# Patient Record
Sex: FEMALE | Race: WHITE | NOT HISPANIC OR LATINO | Employment: OTHER | URBAN - METROPOLITAN AREA
[De-identification: names, ages, dates, MRNs, and addresses within clinical notes are randomized per-mention and may not be internally consistent; named-entity substitution may affect disease eponyms.]

---

## 2019-01-16 ENCOUNTER — HISTORICAL (OUTPATIENT)
Dept: RADIOLOGY | Facility: HOSPITAL | Age: 64
End: 2019-01-16

## 2019-07-03 ENCOUNTER — HISTORICAL (OUTPATIENT)
Dept: LAB | Facility: HOSPITAL | Age: 64
End: 2019-07-03

## 2019-07-03 LAB
ABS NEUT (OLG): 4.18 X10(3)/MCL (ref 2.1–9.2)
ALBUMIN SERPL-MCNC: 3.5 GM/DL (ref 3.4–5)
ALBUMIN/GLOB SERPL: 0.9 RATIO (ref 1.1–2)
ALP SERPL-CCNC: 70 UNIT/L (ref 46–116)
ALT SERPL-CCNC: 23 UNIT/L (ref 12–78)
AST SERPL-CCNC: 23 UNIT/L (ref 15–37)
BASOPHILS # BLD AUTO: 0.1 X10(3)/MCL (ref 0–0.2)
BASOPHILS NFR BLD AUTO: 1 %
BILIRUB SERPL-MCNC: 0.3 MG/DL (ref 0.2–1)
BILIRUBIN DIRECT+TOT PNL SERPL-MCNC: 0.09 MG/DL (ref 0–0.2)
BILIRUBIN DIRECT+TOT PNL SERPL-MCNC: 0.21 MG/DL (ref 0–0.8)
BUN SERPL-MCNC: 10.6 MG/DL (ref 7–18)
CALCIUM SERPL-MCNC: 9.2 MG/DL (ref 8.5–10.1)
CHLORIDE SERPL-SCNC: 103 MMOL/L (ref 98–107)
CHOLEST SERPL-MCNC: 247 MG/DL (ref 0–200)
CHOLEST/HDLC SERPL: 4 {RATIO} (ref 0–4)
CO2 SERPL-SCNC: 29.9 MMOL/L (ref 21–32)
CREAT SERPL-MCNC: 0.78 MG/DL (ref 0.6–1.3)
DEPRECATED CALCIDIOL+CALCIFEROL SERPL-MC: 27.15 NG/ML (ref 30–80)
EOSINOPHIL # BLD AUTO: 0.2 X10(3)/MCL (ref 0–0.9)
EOSINOPHIL NFR BLD AUTO: 3 %
ERYTHROCYTE [DISTWIDTH] IN BLOOD BY AUTOMATED COUNT: 13.1 % (ref 11.5–17)
GLOBULIN SER-MCNC: 3.7 GM/DL (ref 2.4–3.5)
GLUCOSE SERPL-MCNC: 98 MG/DL (ref 74–106)
HCT VFR BLD AUTO: 41.4 % (ref 37–47)
HDLC SERPL-MCNC: 61 MG/DL (ref 40–60)
HGB BLD-MCNC: 13.9 GM/DL (ref 12–16)
IMM GRANULOCYTES # BLD AUTO: 0.02 % (ref 0–0.02)
IMM GRANULOCYTES NFR BLD AUTO: 0.2 % (ref 0–0.43)
LDLC SERPL CALC-MCNC: 162 MG/DL (ref 0–129)
LYMPHOCYTES # BLD AUTO: 2.9 X10(3)/MCL (ref 0.6–4.6)
LYMPHOCYTES NFR BLD AUTO: 35 %
MCH RBC QN AUTO: 31.4 PG (ref 27–31)
MCHC RBC AUTO-ENTMCNC: 33.6 GM/DL (ref 33–36)
MCV RBC AUTO: 93.5 FL (ref 80–94)
MONOCYTES # BLD AUTO: 0.7 X10(3)/MCL (ref 0.1–1.3)
MONOCYTES NFR BLD AUTO: 9 %
NEUTROPHILS # BLD AUTO: 4.18 X10(3)/MCL (ref 1.4–7.9)
NEUTROPHILS NFR BLD AUTO: 52 %
PLATELET # BLD AUTO: 353 X10(3)/MCL (ref 130–400)
PMV BLD AUTO: 9 FL (ref 9.4–12.4)
POTASSIUM SERPL-SCNC: 5.3 MMOL/L (ref 3.5–5.1)
PROT SERPL-MCNC: 7.2 GM/DL (ref 6.4–8.2)
RBC # BLD AUTO: 4.43 X10(6)/MCL (ref 4.2–5.4)
SODIUM SERPL-SCNC: 140 MMOL/L (ref 136–145)
T4 FREE SERPL-MCNC: 0.93 NG/DL (ref 0.76–1.46)
TRIGL SERPL-MCNC: 118 MG/DL
TSH SERPL-ACNC: 5.03 MIU/ML (ref 0.36–3.74)
VLDLC SERPL CALC-MCNC: 24 MG/DL
WBC # SPEC AUTO: 8.1 X10(3)/MCL (ref 4.5–11.5)

## 2022-04-11 ENCOUNTER — HISTORICAL (OUTPATIENT)
Dept: ADMINISTRATIVE | Facility: HOSPITAL | Age: 67
End: 2022-04-11

## 2022-04-29 VITALS
BODY MASS INDEX: 22.4 KG/M2 | DIASTOLIC BLOOD PRESSURE: 79 MMHG | HEIGHT: 61 IN | WEIGHT: 118.63 LBS | OXYGEN SATURATION: 98 % | SYSTOLIC BLOOD PRESSURE: 152 MMHG

## 2022-12-26 ENCOUNTER — HOSPITAL ENCOUNTER (EMERGENCY)
Facility: HOSPITAL | Age: 67
Discharge: HOME | End: 2022-12-26
Attending: EMERGENCY MEDICINE
Payer: MEDICARE

## 2022-12-26 VITALS
SYSTOLIC BLOOD PRESSURE: 174 MMHG | WEIGHT: 107.69 LBS | TEMPERATURE: 98 F | HEART RATE: 78 BPM | OXYGEN SATURATION: 97 % | RESPIRATION RATE: 18 BRPM | DIASTOLIC BLOOD PRESSURE: 81 MMHG | BODY MASS INDEX: 21.14 KG/M2 | HEIGHT: 60 IN

## 2022-12-26 DIAGNOSIS — J44.1 COPD EXACERBATION: Primary | ICD-10-CM

## 2022-12-26 PROCEDURE — 99284 EMERGENCY DEPT VISIT MOD MDM: CPT

## 2022-12-26 RX ORDER — AMOXICILLIN AND CLAVULANATE POTASSIUM 875; 125 MG/1; MG/1
1 TABLET, FILM COATED ORAL 2 TIMES DAILY
Qty: 14 TABLET | Refills: 0 | OUTPATIENT
Start: 2022-12-26 | End: 2022-12-26 | Stop reason: SDUPTHER

## 2022-12-26 RX ORDER — AMOXICILLIN AND CLAVULANATE POTASSIUM 875; 125 MG/1; MG/1
1 TABLET, FILM COATED ORAL 2 TIMES DAILY
Qty: 14 TABLET | Refills: 0 | Status: SHIPPED | OUTPATIENT
Start: 2022-12-26 | End: 2022-12-26 | Stop reason: SDUPTHER

## 2022-12-26 RX ORDER — TIOTROPIUM BROMIDE 18 UG/1
18 CAPSULE ORAL; RESPIRATORY (INHALATION) DAILY
Qty: 30 CAPSULE | Refills: 11 | OUTPATIENT
Start: 2022-12-26 | End: 2022-12-26 | Stop reason: SDUPTHER

## 2022-12-26 RX ORDER — TIOTROPIUM BROMIDE 18 UG/1
18 CAPSULE ORAL; RESPIRATORY (INHALATION) DAILY
Qty: 30 CAPSULE | Refills: 11 | Status: SHIPPED | OUTPATIENT
Start: 2022-12-26 | End: 2022-12-26 | Stop reason: SDUPTHER

## 2022-12-26 RX ORDER — AMOXICILLIN AND CLAVULANATE POTASSIUM 875; 125 MG/1; MG/1
1 TABLET, FILM COATED ORAL 2 TIMES DAILY
Qty: 14 TABLET | Refills: 0 | OUTPATIENT
Start: 2022-12-26 | End: 2022-12-26

## 2022-12-26 RX ORDER — AMOXICILLIN AND CLAVULANATE POTASSIUM 875; 125 MG/1; MG/1
1 TABLET, FILM COATED ORAL 2 TIMES DAILY
Qty: 14 TABLET | Refills: 0 | Status: SHIPPED | OUTPATIENT
Start: 2022-12-26 | End: 2023-02-14

## 2022-12-26 RX ORDER — TIOTROPIUM BROMIDE 18 UG/1
18 CAPSULE ORAL; RESPIRATORY (INHALATION) DAILY
Qty: 30 CAPSULE | Refills: 11 | Status: SHIPPED | OUTPATIENT
Start: 2022-12-26 | End: 2023-02-14

## 2022-12-26 RX ORDER — PREDNISONE 20 MG/1
60 TABLET ORAL DAILY
Qty: 15 TABLET | Refills: 0 | Status: SHIPPED | OUTPATIENT
Start: 2022-12-26 | End: 2022-12-26 | Stop reason: SDUPTHER

## 2022-12-26 RX ORDER — PREDNISONE 20 MG/1
60 TABLET ORAL DAILY
Qty: 15 TABLET | Refills: 0 | OUTPATIENT
Start: 2022-12-26 | End: 2022-12-26 | Stop reason: SDUPTHER

## 2022-12-26 RX ORDER — PREDNISONE 20 MG/1
60 TABLET ORAL DAILY
Qty: 15 TABLET | Refills: 0 | Status: SHIPPED | OUTPATIENT
Start: 2022-12-26 | End: 2022-12-31

## 2022-12-26 NOTE — ED PROVIDER NOTES
"Encounter Date: 12/26/2022       History     Chief Complaint   Patient presents with    Shortness of Breath     Shortness of breath worse over the past couple weeks on exertion. States phlegm feels very "thick".      Mrs. Kristen Crowley is a 68 yo female who presents with chief complaint shortness of breath.  Symptoms about a week ago whenever she began having shortness of breath aggravated with activity and improved with rest and use of her albuterol inhaler.  Associated symptoms of productive cough that she states is chronic but has been more persistent and productive than normal, and wheezing.  She has a history of COPD but does not use any maintenance inhalers, just her albuterol as needed.  States that she had used Spiriva in the past and it seemed to help.  Currently does not have a primary care provider locally.  Denies having any fevers or chest pain.      The history is provided by the patient.   Review of patient's allergies indicates:  No Known Allergies  No past medical history on file.  No past surgical history on file.  No family history on file.     Review of Systems   Respiratory:  Positive for cough, shortness of breath and wheezing.    All other systems reviewed and are negative.    Physical Exam     Initial Vitals [12/26/22 1230]   BP Pulse Resp Temp SpO2   (!) 174/81 78 18 97.5 °F (36.4 °C) 97 %      MAP       --         Physical Exam    Nursing note and vitals reviewed.  Constitutional: She appears well-developed and well-nourished.   HENT:   Head: Normocephalic and atraumatic.   Eyes: EOM are normal. Pupils are equal, round, and reactive to light.   Neck: Neck supple.   Cardiovascular:  Normal rate and regular rhythm.           Pulmonary/Chest: Effort normal. No respiratory distress. She has decreased breath sounds. She has wheezes.   Abdominal: Abdomen is soft.   Musculoskeletal:         General: Normal range of motion.      Cervical back: Neck supple.     Neurological: She is alert and " oriented to person, place, and time. GCS score is 15. GCS eye subscore is 4. GCS verbal subscore is 5. GCS motor subscore is 6.   Skin: Skin is warm and dry. Capillary refill takes less than 2 seconds.   Psychiatric: She has a normal mood and affect.       ED Course   Procedures  Labs Reviewed - No data to display       Imaging Results    None          Medications - No data to display  Medical Decision Making:   Initial Assessment:   Well-appearing 67-year-old female who presents with shortness of breath over the past week associated with cough and wheezing.  History of COPD but is not on any maintenance inhalers at this time, states she is used for given in the past and it has helped.  She does not appear to be in any acute distress and is maintaining normal oxygen saturation on room air.  Discussed starting her on Spiriva since she states it is helped in the past, and did having increasing productive cough and shortness of breath and feel that she would benefit from course of Augmentin and will put her on prednisone 60 mg daily for 5 days.  Given strict return precautions.  I have spoken with the patient and/or caregivers. I have explained the patient's condition, diagnoses and treatment plan based on the information available to me at this time. I have answered the patient's and/or caregiver's questions and addressed any concerns. The patient and/or caregivers have as good an understanding of the patient's diagnosis, condition and treatment plan as can be expected at this point. The vital signs have been stable. The patient's condition is stable and appropriate for discharge from the emergency department.     The patient will pursue further outpatient evaluation with the primary care physician or other designated or consulting physician as outlined in the discharge instructions. The patient and/or caregivers are agreeable to this plan of care and follow-up instructions have been explained in detail. The patient  and/or caregivers have received these instructions in written format and have expressed an understanding of the discharge instructions. The patient and/or caregivers are aware that any significant change in condition or worsening of symptoms should prompt an immediate return to this or the closest emergency department or a call to 911.                        Clinical Impression:   Final diagnoses:  [J44.1] COPD exacerbation (Primary)        ED Disposition Condition    Discharge Stable          ED Prescriptions       Medication Sig Dispense Start Date End Date Auth. Provider    amoxicillin-clavulanate 875-125mg (AUGMENTIN) 875-125 mg per tablet  (Status: Discontinued) Take 1 tablet by mouth 2 (two) times daily. 14 tablet 12/26/2022 12/26/2022 Victor Manuel Rhodes, DO    tiotropium (SPIRIVA) 18 mcg inhalation capsule  (Status: Discontinued) Inhale 1 capsule (18 mcg total) into the lungs once daily. Controller 30 capsule 12/26/2022 12/26/2022 Victor Manuel Rhodes DO    predniSONE (DELTASONE) 20 MG tablet  (Status: Discontinued) Take 3 tablets (60 mg total) by mouth once daily. for 5 days 15 tablet 12/26/2022 12/26/2022 Victor Manuel Rhodes, DO    amoxicillin-clavulanate 875-125mg (AUGMENTIN) 875-125 mg per tablet  (Status: Discontinued) Take 1 tablet by mouth 2 (two) times daily. 14 tablet 12/26/2022 12/26/2022 Victor Manuel Rhodes, DO    predniSONE (DELTASONE) 20 MG tablet  (Status: Discontinued) Take 3 tablets (60 mg total) by mouth once daily. for 5 days 15 tablet 12/26/2022 12/26/2022 Victor Manuel Rhodes, DO    tiotropium (SPIRIVA) 18 mcg inhalation capsule  (Status: Discontinued) Inhale 1 capsule (18 mcg total) into the lungs once daily. Controller 30 capsule 12/26/2022 12/26/2022 Victor Manuel Rhodes, DO    amoxicillin-clavulanate 875-125mg (AUGMENTIN) 875-125 mg per tablet  (Status: Discontinued) Take 1 tablet by mouth 2 (two) times daily. 14 tablet 12/26/2022 12/26/2022 Victor Manuel Rhodes, DO    amoxicillin-clavulanate 875-125mg (AUGMENTIN) 875-125 mg per  tablet Take 1 tablet by mouth 2 (two) times daily. 14 tablet 12/26/2022 -- Victor Manuel Rhoeds DO    predniSONE (DELTASONE) 20 MG tablet Take 3 tablets (60 mg total) by mouth once daily. for 5 days 15 tablet 12/26/2022 12/31/2022 Victor Manuel Rhodes DO    tiotropium (SPIRIVA) 18 mcg inhalation capsule Inhale 1 capsule (18 mcg total) into the lungs once daily. Controller 30 capsule 12/26/2022 12/26/2023 Victor Manuel Rhodes DO          Follow-up Information    None          Victor Manuel Rhodes DO  12/26/22 1157

## 2023-02-14 ENCOUNTER — OFFICE VISIT (OUTPATIENT)
Dept: FAMILY MEDICINE | Facility: CLINIC | Age: 68
End: 2023-02-14
Payer: MEDICARE

## 2023-02-14 VITALS
DIASTOLIC BLOOD PRESSURE: 82 MMHG | OXYGEN SATURATION: 98 % | TEMPERATURE: 99 F | HEART RATE: 89 BPM | SYSTOLIC BLOOD PRESSURE: 142 MMHG | BODY MASS INDEX: 19.87 KG/M2 | WEIGHT: 101.19 LBS | HEIGHT: 60 IN | RESPIRATION RATE: 20 BRPM

## 2023-02-14 DIAGNOSIS — I10 PRIMARY HYPERTENSION: ICD-10-CM

## 2023-02-14 DIAGNOSIS — Z91.199 NONADHERENCE TO MEDICAL TREATMENT: ICD-10-CM

## 2023-02-14 DIAGNOSIS — Z28.20 VACCINE REFUSED BY PATIENT: ICD-10-CM

## 2023-02-14 DIAGNOSIS — Z72.0 TOBACCO USER: ICD-10-CM

## 2023-02-14 DIAGNOSIS — J44.9 CHRONIC OBSTRUCTIVE PULMONARY DISEASE, UNSPECIFIED COPD TYPE: ICD-10-CM

## 2023-02-14 DIAGNOSIS — Z00.00 HEALTHCARE MAINTENANCE: ICD-10-CM

## 2023-02-14 DIAGNOSIS — Z76.89 ENCOUNTER TO ESTABLISH CARE: Primary | ICD-10-CM

## 2023-02-14 PROBLEM — M19.042 ARTHRITIS OF BOTH HANDS: Status: ACTIVE | Noted: 2023-02-14

## 2023-02-14 PROBLEM — M19.041 ARTHRITIS OF BOTH HANDS: Status: ACTIVE | Noted: 2023-02-14

## 2023-02-14 PROBLEM — Z80.0 FAMILY HISTORY OF MALIGNANT NEOPLASM OF COLON: Status: ACTIVE | Noted: 2023-02-14

## 2023-02-14 PROBLEM — E03.9 HYPOTHYROIDISM: Status: ACTIVE | Noted: 2023-02-14

## 2023-02-14 PROCEDURE — 99215 OFFICE O/P EST HI 40 MIN: CPT | Mod: PBBFAC

## 2023-02-14 RX ORDER — ALBUTEROL SULFATE 0.83 MG/ML
2.5 SOLUTION RESPIRATORY (INHALATION)
Qty: 3 ML | Refills: 5 | Status: SHIPPED | OUTPATIENT
Start: 2023-02-14

## 2023-02-14 RX ORDER — ALBUTEROL SULFATE 90 UG/1
AEROSOL, METERED RESPIRATORY (INHALATION)
COMMUNITY
Start: 2022-09-06 | End: 2023-02-14

## 2023-02-14 RX ORDER — ALBUTEROL SULFATE 0.83 MG/ML
2.5 SOLUTION RESPIRATORY (INHALATION)
COMMUNITY
Start: 2022-09-06 | End: 2023-02-14

## 2023-02-14 RX ORDER — TIOTROPIUM BROMIDE INHALATION SPRAY 1.56 UG/1
2 SPRAY, METERED RESPIRATORY (INHALATION) DAILY
Qty: 4 G | Refills: 2 | Status: SHIPPED | OUTPATIENT
Start: 2023-02-14

## 2023-02-14 RX ORDER — ALBUTEROL SULFATE 90 UG/1
AEROSOL, METERED RESPIRATORY (INHALATION)
Qty: 18 G | Refills: 2 | Status: SHIPPED | OUTPATIENT
Start: 2023-02-14

## 2023-02-14 NOTE — PROGRESS NOTES
Subjective:       Patient ID: Kristen Crowley is a 68 y.o. female.    Chief Complaint: COPD, Medication Problem (Spiriva powder not helping with COPD. ), and Cough    HPI  New patient here to establish care.  HX of Asthma and COPD. Current smoker (0.5ppd).  Had COVID 12/22. States has resolved, but still with SOB.  Needs records from outside facility.  Would like Pulm referral and med refills.    Review of Systems   Constitutional:  Negative for chills and fever.   Respiratory:  Positive for cough and wheezing. Negative for shortness of breath.    Cardiovascular:  Negative for chest pain.   Gastrointestinal:  Negative for constipation, diarrhea, nausea and vomiting.   Neurological:  Negative for headaches.     Objective:      Vitals:    02/14/23 1412   BP: (!) 142/82   Pulse:    Resp:    Temp:        Physical Exam   Constitutional: She does not appear ill.   Cardiovascular: Normal rate and regular rhythm.   Pulmonary/Chest: Effort normal. She has wheezes.   Abdominal: Soft. Bowel sounds are normal.   Musculoskeletal:         General: Normal range of motion.   Neurological: She is alert.   Skin: Skin is dry.     Assessment:       1. Encounter to establish care    2. Chronic obstructive pulmonary disease, unspecified COPD type    3. Nonadherence to medical treatment    4. Primary hypertension    5. Tobacco user    6. Vaccine refused by patient    7. Healthcare maintenance          Plan:       Reviewed and updated histories with patient. Asked her to obtain outside records if possible.    Unsure when last PFT performed. Needs medication optimization. Referral to [facility] sent. Refilled requested medications.  Patient has hypertension, but does not want to start medications. Advised against this and reviewed previous Bps with her. Given age and smoking history, at increased risk for MI/TIA/CVA. Will discuss with her again.  5.   Strongly advise smoking cessation.  6.   Informed refusal of all age appropriate vaccine  recommendations.  7. Declines all health screenings.      Future Appointments   Date Time Provider Department Center   3/7/2023  2:00 PM RESIDENT 1, [facility] FAMILY MEDICINE [facility] FARIBA Ng MD, MPH  [facility]

## 2023-02-16 NOTE — PROGRESS NOTES
Faculty Attestation: Kristen Crowley  was seen in Family Medicine Clinic. Patient seen and evaluated at the time of the visit. History of Present Illness, Physical Exam, and Assessment and Plan reviewed. Treatment plan is reasonable and appropriate. Compliance with treatment recommendations is important.  No imaging studies were done today.  No procedure was performed.     Andrew Rodriguez MD

## 2023-04-23 ENCOUNTER — HOSPITAL ENCOUNTER (EMERGENCY)
Facility: HOSPITAL | Age: 68
Discharge: HOME | End: 2023-04-23
Attending: STUDENT IN AN ORGANIZED HEALTH CARE EDUCATION/TRAINING PROGRAM
Payer: MEDICARE

## 2023-04-23 VITALS
RESPIRATION RATE: 20 BRPM | WEIGHT: 102 LBS | HEIGHT: 60 IN | OXYGEN SATURATION: 97 % | SYSTOLIC BLOOD PRESSURE: 196 MMHG | TEMPERATURE: 98 F | DIASTOLIC BLOOD PRESSURE: 85 MMHG | BODY MASS INDEX: 20.03 KG/M2 | HEART RATE: 84 BPM

## 2023-04-23 DIAGNOSIS — M06.9 RHEUMATOID ARTHRITIS FLARE: Primary | ICD-10-CM

## 2023-04-23 PROCEDURE — 99283 EMERGENCY DEPT VISIT LOW MDM: CPT

## 2023-04-23 RX ORDER — PREDNISONE 10 MG/1
10 TABLET ORAL DAILY
Qty: 21 TABLET | Refills: 0 | Status: SHIPPED | OUTPATIENT
Start: 2023-04-23

## 2023-04-26 NOTE — ED PROVIDER NOTES
Encounter Date: 4/23/2023       History     Chief Complaint   Patient presents with    Joint Pain     Pt states she has RA and has pain to both elbows and both wrist for 2 months      68-year-old female presents to ED complaining of bilateral elbow pain the past 2 months has a history of RA with nodules to elbows and hands bilaterally.    Review of patient's allergies indicates:  No Known Allergies  Past Medical History:   Diagnosis Date    COPD (chronic obstructive pulmonary disease)     HTN (hypertension)     Tobacco use      Past Surgical History:   Procedure Laterality Date    APPENDECTOMY       Family History   Problem Relation Age of Onset    Heart failure Mother     Heart failure Father     Kidney disease Father     Heart disease Father     Obesity Sister     Diabetes Sister     Liver cancer Brother      Social History     Tobacco Use    Smoking status: Every Day     Packs/day: 0.50     Types: Cigarettes     Passive exposure: Current    Smokeless tobacco: Never   Substance Use Topics    Alcohol use: Never    Drug use: Not Currently     Types: Marijuana     Review of Systems   Constitutional:  Negative for fever.   HENT:  Negative for sore throat.    Respiratory:  Negative for shortness of breath.    Cardiovascular:  Negative for chest pain.   Gastrointestinal:  Negative for nausea.   Genitourinary:  Negative for dysuria.   Musculoskeletal:  Positive for arthralgias. Negative for back pain.   Skin:  Negative for rash.   Neurological:  Negative for weakness.   Hematological:  Does not bruise/bleed easily.     Physical Exam     Initial Vitals [04/23/23 1121]   BP Pulse Resp Temp SpO2   (!) 196/85 84 20 98.2 °F (36.8 °C) 97 %      MAP       --         Physical Exam    Nursing note and vitals reviewed.  Constitutional: She appears well-developed and well-nourished.   HENT:   Head: Normocephalic.   Eyes: EOM are normal. Pupils are equal, round, and reactive to light.   Neck:   Normal range of  motion.  Cardiovascular:  Normal rate, regular rhythm, normal heart sounds, intact distal pulses and normal pulses.           Pulmonary/Chest: Breath sounds normal. No respiratory distress.   Abdominal: Abdomen is soft. Bowel sounds are normal. There is no abdominal tenderness.   Musculoskeletal:      Cervical back: Normal range of motion.      Comments: Rheumatoid nodules to bilateral elbows and hands throughout +ttp     Neurological: She is alert and oriented to person, place, and time. GCS score is 15. GCS eye subscore is 4. GCS verbal subscore is 5. GCS motor subscore is 6.   Skin: Skin is warm. Capillary refill takes less than 2 seconds.   Psychiatric: She has a normal mood and affect.       ED Course   Procedures  Labs Reviewed - No data to display       Imaging Results    None          Medications - No data to display  Medical Decision Making:   Differential Diagnosis:   RA/arthritis/other musculoskeletal pathology  ED Management:  Patient given p.o. steroid taper and advised to follow up with primary doctor for further management.  ER precautions given patient's stable condition no apparent distress on discharge                        Clinical Impression:   Final diagnoses:  [M06.9] Rheumatoid arthritis flare (Primary)        ED Disposition Condition    Discharge Stable          ED Prescriptions       Medication Sig Dispense Start Date End Date Auth. Provider    predniSONE (DELTASONE) 10 MG tablet Take 1 tablet (10 mg total) by mouth once daily. Take 4 tabs x 3 days, then take 2 tabs x 3 days, then take 1 tab x 3 days. 21 tablet 4/23/2023 -- Vonda Elder MD          Follow-up Information       Follow up With Specialties Details Why Contact Info    Get Ng MD Family Medicine Schedule an appointment as soon as possible for a visit in 1 day  [address] [facility] [address]  [phone]               Vonda Elder MD  04/26/23 8660

## 2023-05-04 ENCOUNTER — HOSPITAL ENCOUNTER (EMERGENCY)
Facility: HOSPITAL | Age: 68
Discharge: LEFT BEFORE DISCHARGE | End: 2023-05-04
Attending: FAMILY MEDICINE
Payer: MEDICARE

## 2023-05-04 VITALS
BODY MASS INDEX: 17.93 KG/M2 | HEART RATE: 81 BPM | SYSTOLIC BLOOD PRESSURE: 199 MMHG | OXYGEN SATURATION: 98 % | WEIGHT: 105 LBS | TEMPERATURE: 98 F | DIASTOLIC BLOOD PRESSURE: 73 MMHG | RESPIRATION RATE: 18 BRPM | HEIGHT: 64 IN

## 2023-05-04 DIAGNOSIS — R07.9 CHEST PAIN: ICD-10-CM

## 2023-05-04 DIAGNOSIS — J44.1 COPD EXACERBATION: Primary | ICD-10-CM

## 2023-05-04 PROCEDURE — 93005 ELECTROCARDIOGRAM TRACING: CPT

## 2023-05-04 PROCEDURE — 99283 EMERGENCY DEPT VISIT LOW MDM: CPT

## 2023-05-04 PROCEDURE — 93010 EKG 12-LEAD: ICD-10-PCS | Mod: ,,, | Performed by: INTERNAL MEDICINE

## 2023-05-04 PROCEDURE — 93010 ELECTROCARDIOGRAM REPORT: CPT | Mod: ,,, | Performed by: INTERNAL MEDICINE

## 2023-05-04 RX ORDER — METHYLPREDNISOLONE SOD SUCC 125 MG
125 VIAL (EA) INJECTION
Status: DISCONTINUED | OUTPATIENT
Start: 2023-05-04 | End: 2023-05-04 | Stop reason: HOSPADM

## 2023-05-04 NOTE — ED NOTES
Went into patient's room to help with treatment and plan of care son and patient at bedside Dr. Moy in room explaining what all testing is for patient raising her voice asking why she needs all of this done at length Dr. Moy explaining everything patient doesn't want any testing . Offered patient to draw her blood and give steriods pt refused. Son was yelling at Dr. Moy security in room escorted son out of room . Patient signed out AMA and ambulated out of ER without difficulty.

## 2023-05-04 NOTE — ED PROVIDER NOTES
Encounter Date: 5/4/2023       History     Chief Complaint   Patient presents with    Shortness of Breath     Sob times two weeks hx of copd denies any pain      When I entered the room the patient was yelling at the nurses and the son was also being verbally abusive and yelling at the nurses apparently the family states that they are totally against COVID and even though the patient is coming in with shortness of breath and history of COPD there refusing any type of testing of COVID as I was trying to explain the purpose of the testing the patient continued to yell at both myself and the nurses and also the son continued to yell without being able to calm the patient down and the continuation of the son yelling security was called the son continued to yell and was asked to leave the building the patient continued to yell and complains stating that her mother was killed in the hospital by being strapped down lungs exploded patient then decided to leave AMA because her son had to be escorted out for being violent and verbally abusive      Review of patient's allergies indicates:  No Known Allergies  Past Medical History:   Diagnosis Date    COPD (chronic obstructive pulmonary disease)     HTN (hypertension)     Tobacco use      Past Surgical History:   Procedure Laterality Date    APPENDECTOMY       Family History   Problem Relation Age of Onset    Heart failure Mother     Heart failure Father     Kidney disease Father     Heart disease Father     Obesity Sister     Diabetes Sister     Liver cancer Brother      Social History     Tobacco Use    Smoking status: Every Day     Packs/day: 0.50     Types: Cigarettes     Passive exposure: Current    Smokeless tobacco: Never   Substance Use Topics    Alcohol use: Never    Drug use: Not Currently     Types: Marijuana     Review of Systems   Unable to perform ROS: Psychiatric disorder   Psychiatric/Behavioral:  Positive for agitation and behavioral problems.      Physical  Exam     Initial Vitals [05/04/23 0835]   BP Pulse Resp Temp SpO2   (!) 199/73 81 18 98.2 °F (36.8 °C) 98 %      MAP       --         Physical Exam    Nursing note and vitals reviewed.  Constitutional: She appears well-developed.   HENT:   Head: Normocephalic and atraumatic.   Right Ear: External ear normal.   Left Ear: External ear normal.   Nose: Nose normal.   Mouth/Throat: Oropharynx is clear and moist. No oropharyngeal exudate.   Eyes: Conjunctivae and EOM are normal. Pupils are equal, round, and reactive to light. Right eye exhibits no discharge. Left eye exhibits no discharge.   Neck: Neck supple. No tracheal deviation present. No JVD present.   Normal range of motion.  Cardiovascular:  Normal rate, regular rhythm, normal heart sounds and intact distal pulses.     Exam reveals no gallop and no friction rub.       No murmur heard.  Pulmonary/Chest: Breath sounds normal. No stridor. No respiratory distress. She has no wheezes. She has no rhonchi. She has no rales.   Abdominal: Abdomen is soft. Bowel sounds are normal. She exhibits no distension and no mass. There is no abdominal tenderness. There is no rebound and no guarding.   Musculoskeletal:         General: Normal range of motion.      Cervical back: Normal range of motion and neck supple.     Neurological: She is alert and oriented to person, place, and time. She has normal strength. No cranial nerve deficit.   Skin: Skin is warm and dry. No rash and no abscess noted. No erythema.   Psychiatric:   Patient and son has become very rear verbally abusive yelling because an attempt was made to test for COVID patient was explained that she can sign a AMA for the testing but after her son was escorted out due to being violent and aggressive the patient decided to leave AMA completely at that time the patient's did in the hallway and screamed at myself and the nurse about her mother being killed at [facility]       ED Course   Procedures  Labs Reviewed    CBC W/ AUTO DIFFERENTIAL    Narrative:     The following orders were created for panel order CBC auto differential.  Procedure                               Abnormality         Status                     ---------                               -----------         ------                     CBC with Differential[[id number]]                                                         Please view results for these tests on the individual orders.   COMPREHENSIVE METABOLIC PANEL   COVID/FLU A&B PCR   URINALYSIS, REFLEX TO URINE CULTURE   B-TYPE NATRIURETIC PEPTIDE   CBC WITH DIFFERENTIAL   POCT TROPONIN     EKG Readings: (Independently Interpreted)   Initial Reading: No STEMI. Rhythm: Normal Sinus Rhythm. Ectopy: No Ectopy. Conduction: Normal. ST Segments: Normal ST Segments. T Waves: Normal. Axis: Normal. Clinical Impression: Normal Sinus Rhythm     Imaging Results    None       X-Rays:   Independently Interpreted Readings:   Other Readings:  Patient refused  Medications   methylPREDNISolone sodium succinate injection 125 mg (has no administration in time range)     Medical Decision Making:   Differential Diagnosis:   COVID flu pneumonia COPD exacerbation  ED Management:  Patient initially was angry even upon triage and rooming the patient apparently when the nurse approached the patient to do COVID testing patient became verbally abusive and so did the son who was accompanying the patient during the time the patient had questions regarding the COVID swab which I did answer but the son continued to yell and show signs of aggressiveness and the patient continued to yell even during my answers at that time the son was escorted out by security due to being aggressive and verbally abusive the patient then was continued to be addressed but patient refused then any type of treatment including steroids patient then stated that she wanted to go AMA because she did not want to be poked and prodded and things done to her she then  began to yell that her mother was killed at a local facility and was angry at myself and the nurses for this patient then did sign AMA we did give the patient the option to be seen again she then began yelling again and becoming verbally abusive patient was then asked to leave because she would already signed AMA and was making her way to the door.                        Clinical Impression:   Final diagnoses:  [R07.9] Chest pain  [J44.1] COPD exacerbation (Primary)        ED Disposition Condition    AMA Stable                Kit Moy MD  05/04/23 0857

## 2023-05-04 NOTE — ED NOTES
Patient is refusing all testing. Dr Moy at the bedside trying to talk with patient, explaining what test he ordered and why. Patient and son became very irrate, screaming. Son was escorted out of ER then the patient decide she did not want the treatment ordered. Dr. Moy again went try to explain to the patient the reasons for testing, patient decided to leave.

## 2024-11-19 ENCOUNTER — HOSPITAL ENCOUNTER (EMERGENCY)
Facility: HOSPITAL | Age: 69
Discharge: HOME | End: 2024-11-19
Attending: SPECIALIST
Payer: MEDICARE

## 2024-11-19 VITALS
BODY MASS INDEX: 19.24 KG/M2 | HEART RATE: 97 BPM | OXYGEN SATURATION: 97 % | TEMPERATURE: 98 F | HEIGHT: 60 IN | SYSTOLIC BLOOD PRESSURE: 162 MMHG | DIASTOLIC BLOOD PRESSURE: 86 MMHG | WEIGHT: 98 LBS | RESPIRATION RATE: 20 BRPM

## 2024-11-19 DIAGNOSIS — R06.02 SHORTNESS OF BREATH: ICD-10-CM

## 2024-11-19 DIAGNOSIS — R03.0 ELEVATED BLOOD PRESSURE READING: ICD-10-CM

## 2024-11-19 DIAGNOSIS — J44.1 COPD EXACERBATION: ICD-10-CM

## 2024-11-19 DIAGNOSIS — J18.9 ATYPICAL PNEUMONIA: Primary | ICD-10-CM

## 2024-11-19 DIAGNOSIS — Z72.0 TOBACCO USE: ICD-10-CM

## 2024-11-19 LAB
ALBUMIN SERPL-MCNC: 3.6 G/DL (ref 3.4–4.8)
ALBUMIN/GLOB SERPL: 0.8 RATIO (ref 1.1–2)
ALP SERPL-CCNC: 83 UNIT/L (ref 40–150)
ALT SERPL-CCNC: 14 UNIT/L (ref 0–55)
ANION GAP SERPL CALC-SCNC: 8 MEQ/L
AST SERPL-CCNC: 14 UNIT/L (ref 5–34)
BASOPHILS # BLD AUTO: 0.03 X10(3)/MCL
BASOPHILS NFR BLD AUTO: 0.2 %
BILIRUB SERPL-MCNC: 0.3 MG/DL
BUN SERPL-MCNC: 18.3 MG/DL (ref 9.8–20.1)
CALCIUM SERPL-MCNC: 9.6 MG/DL (ref 8.4–10.2)
CHLORIDE SERPL-SCNC: 105 MMOL/L (ref 98–107)
CO2 SERPL-SCNC: 26 MMOL/L (ref 23–31)
CREAT SERPL-MCNC: 0.77 MG/DL (ref 0.55–1.02)
CREAT/UREA NIT SERPL: 24
EOSINOPHIL # BLD AUTO: 0.02 X10(3)/MCL (ref 0–0.9)
EOSINOPHIL NFR BLD AUTO: 0.1 %
ERYTHROCYTE [DISTWIDTH] IN BLOOD BY AUTOMATED COUNT: 13.5 % (ref 11.5–17)
GFR SERPLBLD CREATININE-BSD FMLA CKD-EPI: >60 ML/MIN/1.73/M2
GLOBULIN SER-MCNC: 4.4 GM/DL (ref 2.4–3.5)
GLUCOSE SERPL-MCNC: 140 MG/DL (ref 82–115)
HCT VFR BLD AUTO: 43.7 % (ref 37–47)
HGB BLD-MCNC: 13.8 G/DL (ref 12–16)
IMM GRANULOCYTES # BLD AUTO: 0.02 X10(3)/MCL (ref 0–0.04)
IMM GRANULOCYTES NFR BLD AUTO: 0.1 %
LYMPHOCYTES # BLD AUTO: 1.2 X10(3)/MCL (ref 0.6–4.6)
LYMPHOCYTES NFR BLD AUTO: 8.3 %
MCH RBC QN AUTO: 30.1 PG (ref 27–31)
MCHC RBC AUTO-ENTMCNC: 31.6 G/DL (ref 33–36)
MCV RBC AUTO: 95.2 FL (ref 80–94)
MONOCYTES # BLD AUTO: 0.77 X10(3)/MCL (ref 0.1–1.3)
MONOCYTES NFR BLD AUTO: 5.4 %
NEUTROPHILS # BLD AUTO: 12.34 X10(3)/MCL (ref 2.1–9.2)
NEUTROPHILS NFR BLD AUTO: 85.9 %
PLATELET # BLD AUTO: 401 X10(3)/MCL (ref 130–400)
PMV BLD AUTO: 8.8 FL (ref 7.4–10.4)
POTASSIUM SERPL-SCNC: 4.2 MMOL/L (ref 3.5–5.1)
PROT SERPL-MCNC: 8 GM/DL (ref 5.8–7.6)
RBC # BLD AUTO: 4.59 X10(6)/MCL (ref 4.2–5.4)
SODIUM SERPL-SCNC: 139 MMOL/L (ref 136–145)
WBC # BLD AUTO: 14.38 X10(3)/MCL (ref 4.5–11.5)

## 2024-11-19 PROCEDURE — 25000242 PHARM REV CODE 250 ALT 637 W/ HCPCS: Performed by: SPECIALIST

## 2024-11-19 PROCEDURE — 85025 COMPLETE CBC W/AUTO DIFF WBC: CPT | Performed by: SPECIALIST

## 2024-11-19 PROCEDURE — 63600175 PHARM REV CODE 636 W HCPCS: Performed by: SPECIALIST

## 2024-11-19 PROCEDURE — 96374 THER/PROPH/DIAG INJ IV PUSH: CPT

## 2024-11-19 PROCEDURE — 25000003 PHARM REV CODE 250: Performed by: SPECIALIST

## 2024-11-19 PROCEDURE — 63700000 PHARM REV CODE 250 ALT 637 W/O HCPCS: Performed by: SPECIALIST

## 2024-11-19 PROCEDURE — 93010 ELECTROCARDIOGRAM REPORT: CPT | Mod: ,,, | Performed by: INTERNAL MEDICINE

## 2024-11-19 PROCEDURE — 99285 EMERGENCY DEPT VISIT HI MDM: CPT | Mod: 25

## 2024-11-19 PROCEDURE — 94640 AIRWAY INHALATION TREATMENT: CPT | Mod: XB

## 2024-11-19 PROCEDURE — 80053 COMPREHEN METABOLIC PANEL: CPT | Performed by: SPECIALIST

## 2024-11-19 PROCEDURE — 93005 ELECTROCARDIOGRAM TRACING: CPT

## 2024-11-19 RX ORDER — TIOTROPIUM BROMIDE INHALATION SPRAY 3.12 UG/1
2 SPRAY, METERED RESPIRATORY (INHALATION) DAILY
Qty: 4 G | Refills: 1 | Status: SHIPPED | OUTPATIENT
Start: 2024-11-19 | End: 2024-11-19

## 2024-11-19 RX ORDER — AZITHROMYCIN 250 MG/1
250 TABLET, FILM COATED ORAL DAILY
Qty: 4 TABLET | Refills: 0 | Status: SHIPPED | OUTPATIENT
Start: 2024-11-19 | End: 2024-11-19

## 2024-11-19 RX ORDER — AZITHROMYCIN 250 MG/1
250 TABLET, FILM COATED ORAL DAILY
Qty: 4 TABLET | Refills: 0 | Status: SHIPPED | OUTPATIENT
Start: 2024-11-19 | End: 2024-11-23

## 2024-11-19 RX ORDER — PREDNISONE 20 MG/1
20 TABLET ORAL 2 TIMES DAILY
Qty: 10 TABLET | Refills: 0 | Status: SHIPPED | OUTPATIENT
Start: 2024-11-19 | End: 2024-11-19

## 2024-11-19 RX ORDER — IPRATROPIUM BROMIDE AND ALBUTEROL SULFATE 2.5; .5 MG/3ML; MG/3ML
3 SOLUTION RESPIRATORY (INHALATION)
Status: COMPLETED | OUTPATIENT
Start: 2024-11-19 | End: 2024-11-19

## 2024-11-19 RX ORDER — CLONIDINE HYDROCHLORIDE 0.1 MG/1
0.1 TABLET ORAL
Status: COMPLETED | OUTPATIENT
Start: 2024-11-19 | End: 2024-11-19

## 2024-11-19 RX ORDER — AZITHROMYCIN 250 MG/1
500 TABLET, FILM COATED ORAL
Status: COMPLETED | OUTPATIENT
Start: 2024-11-19 | End: 2024-11-19

## 2024-11-19 RX ORDER — TIOTROPIUM BROMIDE INHALATION SPRAY 3.12 UG/1
2 SPRAY, METERED RESPIRATORY (INHALATION) DAILY
Qty: 4 G | Refills: 1 | Status: SHIPPED | OUTPATIENT
Start: 2024-11-19

## 2024-11-19 RX ORDER — IPRATROPIUM BROMIDE AND ALBUTEROL SULFATE 2.5; .5 MG/3ML; MG/3ML
3 SOLUTION RESPIRATORY (INHALATION) EVERY 6 HOURS PRN
Qty: 75 ML | Refills: 0 | Status: SHIPPED | OUTPATIENT
Start: 2024-11-19 | End: 2024-11-19

## 2024-11-19 RX ORDER — IPRATROPIUM BROMIDE AND ALBUTEROL SULFATE 2.5; .5 MG/3ML; MG/3ML
3 SOLUTION RESPIRATORY (INHALATION) EVERY 6 HOURS PRN
Qty: 75 ML | Refills: 0 | Status: SHIPPED | OUTPATIENT
Start: 2024-11-19 | End: 2025-11-19

## 2024-11-19 RX ORDER — PREDNISONE 20 MG/1
20 TABLET ORAL 2 TIMES DAILY
Qty: 10 TABLET | Refills: 0 | Status: SHIPPED | OUTPATIENT
Start: 2024-11-19 | End: 2024-11-24

## 2024-11-19 RX ADMIN — IPRATROPIUM BROMIDE AND ALBUTEROL SULFATE 3 ML: .5; 3 SOLUTION RESPIRATORY (INHALATION) at 06:11

## 2024-11-19 RX ADMIN — IPRATROPIUM BROMIDE AND ALBUTEROL SULFATE 3 ML: .5; 3 SOLUTION RESPIRATORY (INHALATION) at 08:11

## 2024-11-19 RX ADMIN — CLONIDINE HYDROCHLORIDE 0.1 MG: 0.1 TABLET ORAL at 08:11

## 2024-11-19 RX ADMIN — METHYLPREDNISOLONE SODIUM SUCCINATE 80 MG: 40 INJECTION, POWDER, FOR SOLUTION INTRAMUSCULAR; INTRAVENOUS at 07:11

## 2024-11-19 RX ADMIN — AZITHROMYCIN DIHYDRATE 500 MG: 250 TABLET ORAL at 08:11

## 2024-11-20 LAB
OHS QRS DURATION: 74 MS
OHS QRS DURATION: 78 MS
OHS QTC CALCULATION: 445 MS
OHS QTC CALCULATION: 450 MS

## 2024-11-20 NOTE — ED PROVIDER NOTES
Encounter Date: 2024       History     Chief Complaint   Patient presents with    Shortness of Breath     Pt c/o SOB today -presents wearing 02 3L NC that is not for her -states it was her late husbands 02 --resp labored at 24/min -02 sat 94% on RA      69-year-old female with a history of COPD notes shortness of breath and wheezing that began today; no fever or chills, no cough; she presented wearing oxygen 3 L per minute nasal cannula that was her  's oxygen tank; she did an albuterol nebulizer treatment at home; patient has history of hypertension and continues to smoke    The history is provided by the patient.     Review of patient's allergies indicates:  No Known Allergies  Past Medical History:   Diagnosis Date    COPD (chronic obstructive pulmonary disease)     HTN (hypertension)     Tobacco use      Past Surgical History:   Procedure Laterality Date    APPENDECTOMY       Family History   Problem Relation Name Age of Onset    Heart failure Mother      Heart failure Father      Kidney disease Father      Heart disease Father      Obesity Sister      Diabetes Sister      Liver cancer Brother       Social History     Tobacco Use    Smoking status: Every Day     Current packs/day: 0.50     Types: Cigarettes     Passive exposure: Current    Smokeless tobacco: Never   Substance Use Topics    Alcohol use: Never    Drug use: Not Currently     Types: Marijuana     Review of Systems   Constitutional: Negative.    HENT: Negative.     Respiratory:  Positive for wheezing.    Cardiovascular: Negative.    Gastrointestinal: Negative.    Musculoskeletal: Negative.    Skin: Negative.    Neurological: Negative.    All other systems reviewed and are negative.      Physical Exam     Initial Vitals [24 1828]   BP Pulse Resp Temp SpO2   (!) 228/114 107 (!) 24 98.4 °F (36.9 °C) (!) 94 %      MAP       --         Physical Exam    Nursing note and vitals reviewed.  Constitutional: She appears well-developed  and well-nourished.   HENT:   Head: Normocephalic and atraumatic. Mouth/Throat: Oropharynx is clear and moist.   Eyes: EOM are normal. Pupils are equal, round, and reactive to light.   Neck: Neck supple.   Normal range of motion.  Cardiovascular:  Regular rhythm, normal heart sounds and intact distal pulses.   Tachycardia present.         Pulmonary/Chest: She has wheezes (Bilateral, expiratory, moderate).   Abdominal: Abdomen is soft. There is no abdominal tenderness.   Musculoskeletal:         General: Normal range of motion.      Cervical back: Normal range of motion and neck supple.     Neurological: She is alert and oriented to person, place, and time. She has normal strength. No cranial nerve deficit. GCS score is 15. GCS eye subscore is 4. GCS verbal subscore is 5. GCS motor subscore is 6.   Skin: Skin is warm and dry.         ED Course   Procedures  Labs Reviewed   COMPREHENSIVE METABOLIC PANEL - Abnormal       Result Value    Sodium 139      Potassium 4.2      Chloride 105      CO2 26      Glucose 140 (*)     Blood Urea Nitrogen 18.3      Creatinine 0.77      Calcium 9.6      Protein Total 8.0 (*)     Albumin 3.6      Globulin 4.4 (*)     Albumin/Globulin Ratio 0.8 (*)     Bilirubin Total 0.3      ALP 83      ALT 14      AST 14      eGFR >60      Anion Gap 8.0      BUN/Creatinine Ratio 24     CBC WITH DIFFERENTIAL - Abnormal    WBC 14.38 (*)     RBC 4.59      Hgb 13.8      Hct 43.7      MCV 95.2 (*)     MCH 30.1      MCHC 31.6 (*)     RDW 13.5      Platelet 401 (*)     MPV 8.8      Neut % 85.9      Lymph % 8.3      Mono % 5.4      Eos % 0.1      Basophil % 0.2      Lymph # 1.20      Neut # 12.34 (*)     Mono # 0.77      Eos # 0.02      Baso # 0.03      IG# 0.02      IG% 0.1     CBC W/ AUTO DIFFERENTIAL    Narrative:     The following orders were created for panel order CBC auto differential.  Procedure                               Abnormality         Status                     ---------                                -----------         ------                     CBC with Differential[[id number]]        Abnormal            Final result                 Please view results for these tests on the individual orders.     EKG Readings: (Independently Interpreted)   Rhythm: Sinus Tachycardia. Heart Rate: 102. Ectopy: No Ectopy. Conduction: Normal. ST Segments: Normal ST Segments. T Waves: Normal. Clinical Impression: Normal Sinus Rhythm   BETTE; anteroseptal infarct age undetermined       Imaging Results              X-Ray Chest AP Portable (Final result)  Result time 11/19/24 18:57:14      Final result by Yudy Castellanos MD (11/19/24 18:57:14)                   Impression:      Findings seen consistent with COPD and emphysema with mild interstitial infiltrate seen in the right and left upper lobe.  Short-term follow-up is recommended.      Electronically signed by: Alejo Castellanos  Date:    11/19/2024  Time:    18:57               Narrative:    EXAMINATION:  XR CHEST AP PORTABLE    CLINICAL HISTORY:  Shortness of breath;    TECHNIQUE:  Single frontal view of the chest was performed.    COMPARISON:  11/16/2019    FINDINGS:  There are changes seen consistent with COPD and emphysema in the lungs bilaterally.  There is a mild interstitial infiltrate in the right upper lobe and left upper lobe.  The heart is normal appearance.  Pulmonary vascularity is unremarkable.  The aorta is slightly ectatic.  Bones and joints show no acute abnormality.                                       Medications   methylPREDNISolone sodium succinate injection 80 mg (80 mg Intravenous Given 11/19/24 1929)   albuterol-ipratropium 2.5 mg-0.5 mg/3 mL nebulizer solution 3 mL (3 mLs Nebulization Given 11/19/24 1852)   albuterol-ipratropium 2.5 mg-0.5 mg/3 mL nebulizer solution 3 mL (3 mLs Nebulization Given 11/19/24 2002)   cloNIDine tablet 0.1 mg (0.1 mg Oral Given 11/19/24 2010)   azithromycin tablet 500 mg (500 mg Oral Given 11/19/24 2010)     Medical  Decision Making  69-year-old female with a history of COPD notes shortness of breath and wheezing that began today; no fever or chills, no cough; she presented wearing oxygen 3 L per minute nasal cannula that was her  's oxygen tank; she did an albuterol nebulizer treatment at home; patient has history of hypertension and continues to smoke    DIFFERENTIAL DIAGNOSIS- COPD exacerbation, pleural effusion, pneumonia, bronchitis    Amount and/or Complexity of Data Reviewed  External Data Reviewed: notes.  Labs: ordered. Decision-making details documented in ED Course.  Radiology: ordered. Decision-making details documented in ED Course.  ECG/medicine tests: ordered and independent interpretation performed. Decision-making details documented in ED Course.    Risk  Prescription drug management.  Risk Details: Patient given Solu-Medrol 80 mg IV and DuoNeb nebulizer treatment; chest x-ray indicated infiltrates in the upper lung fields and patient notes thick sputum; also found to have a WBC 14.3; will treat as possible atypical pneumonia with azithromycin; patient is much improved at discharge; she will be also treated with prednisone 20 mg twice a day for 5 days, refills Spiriva which she has not used in quite some time, prescription for DuoNeb q.6 hours p.r.n. to replace her albuterol   Follow up with the primary care physician within a week    Lengthy conversation with patient and her daughter about tobacco cessation       Patient Vitals for the past 24 hrs:   BP Temp Temp src Pulse Resp SpO2 Height Weight   24 (!) 162/86 -- -- 97 20 97 % -- --   24 1852 -- -- -- 104 (!) 22 (!) 93 % -- --   24 1830 (!) 180/104 -- -- 104 (!) 23 95 % -- --   24 1828 (!) 228/114 98.4 °F (36.9 °C) Oral 107 (!) 24 (!) 94 % 5' (1.524 m) 44.5 kg (98 lb)              The patient is resting comfortably and in no acute distress.  She states that her symptoms have improved after treatment in Emergency  Department. I personally discussed her test results and treatment plan.  Gave strict ED precautions.  Specific conditions for return to the emergency department and importance of follow up with her primary care provided or the physician listed on the discharge instructions.  Patient voices understanding and agrees to the plan discussed. All patients' questions have been answered at this time.   She has remained hemodynamically stable throughout entire stay in ED and is stable for discharge home.                      Clinical Impression:  Final diagnoses:  [R06.02] Shortness of breath  [J44.1] COPD exacerbation  [J18.9] Atypical pneumonia (Primary)  [R03.0] Elevated blood pressure reading  [Z72.0] Tobacco use          ED Disposition Condition    Discharge Stable          ED Prescriptions       Medication Sig Dispense Start Date End Date Auth. Provider    azithromycin (Z-RUTH) 250 MG tablet  (Status: Discontinued) Take 1 tablet (250 mg total) by mouth once daily. 1 tablet daily for 4 days 4 tablet 11/19/2024 11/19/2024 Gabe Marcos MD    albuterol-ipratropium (DUO-NEB) 2.5 mg-0.5 mg/3 mL nebulizer solution  (Status: Discontinued) Take 3 mLs by nebulization every 6 (six) hours as needed for Wheezing. Rescue 75 mL 11/19/2024 11/19/2024 Gabe Marcos MD    tiotropium bromide (SPIRIVA RESPIMAT) 2.5 mcg/actuation inhaler  (Status: Discontinued) Inhale 2 puffs into the lungs Daily. Controller 4 g 11/19/2024 11/19/2024 Gabe Marcos MD    predniSONE (DELTASONE) 20 MG tablet  (Status: Discontinued) Take 1 tablet (20 mg total) by mouth 2 (two) times daily. for 5 days 10 tablet 11/19/2024 11/19/2024 Gabe Marcos MD    tiotropium bromide (SPIRIVA RESPIMAT) 2.5 mcg/actuation inhaler Inhale 2 puffs into the lungs Daily. Controller 4 g 11/19/2024 -- Gabe Marcos MD    predniSONE (DELTASONE) 20 MG tablet Take 1 tablet (20 mg total) by mouth 2 (two) times daily. for 5 days 10 tablet 11/19/2024 11/24/2024 Gabe Marcos  MD ADAN    azithromycin (Z-RUTH) 250 MG tablet Take 1 tablet (250 mg total) by mouth once daily. 1 tablet daily for 4 days 4 tablet 11/19/2024 11/23/2024 Gabe Marcos MD    albuterol-ipratropium (DUO-NEB) 2.5 mg-0.5 mg/3 mL nebulizer solution Take 3 mLs by nebulization every 6 (six) hours as needed for Wheezing. Rescue 75 mL 11/19/2024 11/19/2025 Gabe Marcos MD          Follow-up Information       Follow up With Specialties Details Why Contact Info    Leonel Frederick MD Family Medicine In 1 week  [address]  [place] [address]  [phone]               Gabe Marcos MD  11/20/24 0024

## 2025-06-20 ENCOUNTER — HOSPITAL ENCOUNTER (INPATIENT)
Facility: HOSPITAL | Age: 70
LOS: 6 days | Discharge: HOME | DRG: 871 | End: 2025-06-27
Attending: EMERGENCY MEDICINE | Admitting: STUDENT IN AN ORGANIZED HEALTH CARE EDUCATION/TRAINING PROGRAM
Payer: MEDICARE

## 2025-06-20 DIAGNOSIS — R06.02 SOB (SHORTNESS OF BREATH): ICD-10-CM

## 2025-06-20 DIAGNOSIS — J44.0 COPD WITH PNEUMONIA: ICD-10-CM

## 2025-06-20 DIAGNOSIS — I71.23 ANEURYSM OF DESCENDING THORACIC AORTA WITHOUT RUPTURE: ICD-10-CM

## 2025-06-20 DIAGNOSIS — D75.839 THROMBOCYTOSIS: ICD-10-CM

## 2025-06-20 DIAGNOSIS — J44.1 COPD EXACERBATION: Primary | ICD-10-CM

## 2025-06-20 DIAGNOSIS — Z00.00 ROUTINE CHECK-UP: ICD-10-CM

## 2025-06-20 DIAGNOSIS — J18.9 PNEUMONIA OF RIGHT LOWER LOBE DUE TO INFECTIOUS ORGANISM: ICD-10-CM

## 2025-06-20 DIAGNOSIS — N39.45 CONTINUOUS LEAKAGE OF URINE: ICD-10-CM

## 2025-06-20 DIAGNOSIS — J18.9 COPD WITH PNEUMONIA: ICD-10-CM

## 2025-06-20 PROBLEM — A41.9 SEVERE SEPSIS: Status: ACTIVE | Noted: 2025-06-20

## 2025-06-20 PROBLEM — R65.20 SEVERE SEPSIS: Status: ACTIVE | Noted: 2025-06-20

## 2025-06-20 LAB
ALBUMIN SERPL-MCNC: 2.5 G/DL (ref 3.4–4.8)
ALBUMIN/GLOB SERPL: 0.6 RATIO (ref 1.1–2)
ALP SERPL-CCNC: 124 UNIT/L (ref 40–150)
ALT SERPL-CCNC: 19 UNIT/L (ref 0–55)
ANION GAP SERPL CALC-SCNC: 12 MEQ/L
AST SERPL-CCNC: 16 UNIT/L (ref 11–45)
B PERT.PT PRMT NPH QL NAA+NON-PROBE: NOT DETECTED
BACTERIA #/AREA URNS AUTO: ABNORMAL /HPF
BASOPHILS # BLD AUTO: 0.03 X10(3)/MCL
BASOPHILS NFR BLD AUTO: 0.2 %
BILIRUB SERPL-MCNC: 0.2 MG/DL
BILIRUB UR QL STRIP.AUTO: NEGATIVE
BUN SERPL-MCNC: 22.9 MG/DL (ref 9.8–20.1)
C PNEUM DNA NPH QL NAA+NON-PROBE: NOT DETECTED
CALCIUM SERPL-MCNC: 8.4 MG/DL (ref 8.4–10.2)
CHLORIDE SERPL-SCNC: 93 MMOL/L (ref 98–107)
CLARITY UR: CLEAR
CO2 SERPL-SCNC: 29 MMOL/L (ref 23–31)
COLOR UR AUTO: ABNORMAL
CREAT SERPL-MCNC: 0.66 MG/DL (ref 0.55–1.02)
CREAT/UREA NIT SERPL: 35
EOSINOPHIL # BLD AUTO: 0 X10(3)/MCL (ref 0–0.9)
EOSINOPHIL NFR BLD AUTO: 0 %
ERYTHROCYTE [DISTWIDTH] IN BLOOD BY AUTOMATED COUNT: 13.9 % (ref 11.5–17)
FLUAV AG UPPER RESP QL IA.RAPID: NOT DETECTED
FLUBV AG UPPER RESP QL IA.RAPID: NOT DETECTED
GFR SERPLBLD CREATININE-BSD FMLA CKD-EPI: >60 ML/MIN/1.73/M2
GLOBULIN SER-MCNC: 4.5 GM/DL (ref 2.4–3.5)
GLUCOSE SERPL-MCNC: 129 MG/DL (ref 82–115)
GLUCOSE UR QL STRIP: NORMAL
HADV DNA NPH QL NAA+NON-PROBE: NOT DETECTED
HCOV 229E RNA NPH QL NAA+NON-PROBE: NOT DETECTED
HCOV HKU1 RNA NPH QL NAA+NON-PROBE: NOT DETECTED
HCOV NL63 RNA NPH QL NAA+NON-PROBE: NOT DETECTED
HCOV OC43 RNA NPH QL NAA+NON-PROBE: NOT DETECTED
HCT VFR BLD AUTO: 42.2 % (ref 37–47)
HGB BLD-MCNC: 13.8 G/DL (ref 12–16)
HGB UR QL STRIP: NEGATIVE
HMPV RNA NPH QL NAA+NON-PROBE: NOT DETECTED
HOLD SPECIMEN: NORMAL
HPIV1 RNA NPH QL NAA+NON-PROBE: NOT DETECTED
HPIV2 RNA NPH QL NAA+NON-PROBE: NOT DETECTED
HPIV3 RNA NPH QL NAA+NON-PROBE: NOT DETECTED
HPIV4 RNA NPH QL NAA+NON-PROBE: NOT DETECTED
HYALINE CASTS #/AREA URNS LPF: ABNORMAL /LPF
IMM GRANULOCYTES # BLD AUTO: 0.13 X10(3)/MCL (ref 0–0.04)
IMM GRANULOCYTES NFR BLD AUTO: 0.7 %
KETONES UR QL STRIP: ABNORMAL
LACTATE SERPL-SCNC: 2.6 MMOL/L (ref 0.5–2.2)
LACTATE SERPL-SCNC: 3.3 MMOL/L (ref 0.5–2.2)
LEUKOCYTE ESTERASE UR QL STRIP: NEGATIVE
LYMPHOCYTES # BLD AUTO: 1.29 X10(3)/MCL (ref 0.6–4.6)
LYMPHOCYTES NFR BLD AUTO: 6.5 %
M PNEUMO DNA NPH QL NAA+NON-PROBE: NOT DETECTED
MCH RBC QN AUTO: 29.1 PG (ref 27–31)
MCHC RBC AUTO-ENTMCNC: 32.7 G/DL (ref 33–36)
MCV RBC AUTO: 88.8 FL (ref 80–94)
MONOCYTES # BLD AUTO: 1.3 X10(3)/MCL (ref 0.1–1.3)
MONOCYTES NFR BLD AUTO: 6.5 %
NEUTROPHILS # BLD AUTO: 17.12 X10(3)/MCL (ref 2.1–9.2)
NEUTROPHILS NFR BLD AUTO: 86.1 %
NITRITE UR QL STRIP: NEGATIVE
NRBC BLD AUTO-RTO: 0 %
PH UR STRIP: 7 [PH]
PLATELET # BLD AUTO: 511 X10(3)/MCL (ref 130–400)
PMV BLD AUTO: 8.9 FL (ref 7.4–10.4)
POTASSIUM SERPL-SCNC: 3.6 MMOL/L (ref 3.5–5.1)
PROT SERPL-MCNC: 7 GM/DL (ref 5.8–7.6)
PROT UR QL STRIP: NEGATIVE
RBC # BLD AUTO: 4.75 X10(6)/MCL (ref 4.2–5.4)
RBC #/AREA URNS AUTO: ABNORMAL /HPF
RSV A 5' UTR RNA NPH QL NAA+PROBE: NOT DETECTED
RSV RNA NPH QL NAA+NON-PROBE: NOT DETECTED
RV+EV RNA NPH QL NAA+NON-PROBE: DETECTED
SARS-COV-2 RNA RESP QL NAA+PROBE: NOT DETECTED
SODIUM SERPL-SCNC: 134 MMOL/L (ref 136–145)
SP GR UR STRIP.AUTO: 1.04 (ref 1–1.03)
SQUAMOUS #/AREA URNS LPF: ABNORMAL /HPF
T4 FREE SERPL-MCNC: 1.18 NG/DL (ref 0.7–1.48)
TSH SERPL-ACNC: 1.42 UIU/ML (ref 0.35–4.94)
UROBILINOGEN UR STRIP-ACNC: NORMAL
WBC # BLD AUTO: 19.87 X10(3)/MCL (ref 4.5–11.5)
WBC #/AREA URNS AUTO: ABNORMAL /HPF

## 2025-06-20 PROCEDURE — 87070 CULTURE OTHR SPECIMN AEROBIC: CPT

## 2025-06-20 PROCEDURE — 84443 ASSAY THYROID STIM HORMONE: CPT

## 2025-06-20 PROCEDURE — 99285 EMERGENCY DEPT VISIT HI MDM: CPT | Mod: 25

## 2025-06-20 PROCEDURE — 96361 HYDRATE IV INFUSION ADD-ON: CPT

## 2025-06-20 PROCEDURE — 87205 SMEAR GRAM STAIN: CPT

## 2025-06-20 PROCEDURE — 85025 COMPLETE CBC W/AUTO DIFF WBC: CPT | Performed by: PHYSICIAN ASSISTANT

## 2025-06-20 PROCEDURE — 96365 THER/PROPH/DIAG IV INF INIT: CPT

## 2025-06-20 PROCEDURE — 63600175 PHARM REV CODE 636 W HCPCS

## 2025-06-20 PROCEDURE — 25000242 PHARM REV CODE 250 ALT 637 W/ HCPCS: Performed by: PHYSICIAN ASSISTANT

## 2025-06-20 PROCEDURE — 36415 COLL VENOUS BLD VENIPUNCTURE: CPT

## 2025-06-20 PROCEDURE — 81001 URINALYSIS AUTO W/SCOPE: CPT | Performed by: PHYSICIAN ASSISTANT

## 2025-06-20 PROCEDURE — 99900035 HC TECH TIME PER 15 MIN (STAT)

## 2025-06-20 PROCEDURE — 63600175 PHARM REV CODE 636 W HCPCS: Performed by: PHYSICIAN ASSISTANT

## 2025-06-20 PROCEDURE — 25000003 PHARM REV CODE 250

## 2025-06-20 PROCEDURE — 80061 LIPID PANEL: CPT

## 2025-06-20 PROCEDURE — G0378 HOSPITAL OBSERVATION PER HR: HCPCS

## 2025-06-20 PROCEDURE — 87632 RESP VIRUS 6-11 TARGETS: CPT

## 2025-06-20 PROCEDURE — 25500020 PHARM REV CODE 255: Performed by: PHYSICIAN ASSISTANT

## 2025-06-20 PROCEDURE — 93005 ELECTROCARDIOGRAM TRACING: CPT

## 2025-06-20 PROCEDURE — 94640 AIRWAY INHALATION TREATMENT: CPT | Mod: XB

## 2025-06-20 PROCEDURE — 0241U COVID/RSV/FLU A&B PCR: CPT | Performed by: PHYSICIAN ASSISTANT

## 2025-06-20 PROCEDURE — 96375 TX/PRO/DX INJ NEW DRUG ADDON: CPT

## 2025-06-20 PROCEDURE — 87641 MR-STAPH DNA AMP PROBE: CPT

## 2025-06-20 PROCEDURE — 80053 COMPREHEN METABOLIC PANEL: CPT | Performed by: PHYSICIAN ASSISTANT

## 2025-06-20 PROCEDURE — 87040 BLOOD CULTURE FOR BACTERIA: CPT | Mod: 59 | Performed by: PHYSICIAN ASSISTANT

## 2025-06-20 PROCEDURE — 83605 ASSAY OF LACTIC ACID: CPT | Performed by: PHYSICIAN ASSISTANT

## 2025-06-20 PROCEDURE — 94761 N-INVAS EAR/PLS OXIMETRY MLT: CPT

## 2025-06-20 PROCEDURE — 25000003 PHARM REV CODE 250: Performed by: PHYSICIAN ASSISTANT

## 2025-06-20 PROCEDURE — 84439 ASSAY OF FREE THYROXINE: CPT

## 2025-06-20 PROCEDURE — 83605 ASSAY OF LACTIC ACID: CPT | Mod: 91

## 2025-06-20 RX ORDER — CEFTRIAXONE 1 G/1
1 INJECTION, POWDER, FOR SOLUTION INTRAMUSCULAR; INTRAVENOUS
Status: DISCONTINUED | OUTPATIENT
Start: 2025-06-21 | End: 2025-06-20

## 2025-06-20 RX ORDER — CEFTRIAXONE 1 G/1
1 INJECTION, POWDER, FOR SOLUTION INTRAMUSCULAR; INTRAVENOUS
Status: COMPLETED | OUTPATIENT
Start: 2025-06-20 | End: 2025-06-20

## 2025-06-20 RX ORDER — AMOXICILLIN 250 MG
1 CAPSULE ORAL 2 TIMES DAILY
Status: DISCONTINUED | OUTPATIENT
Start: 2025-06-20 | End: 2025-06-27 | Stop reason: HOSPADM

## 2025-06-20 RX ORDER — LABETALOL HCL 20 MG/4 ML
10 SYRINGE (ML) INTRAVENOUS EVERY 6 HOURS PRN
Status: DISCONTINUED | OUTPATIENT
Start: 2025-06-20 | End: 2025-06-27 | Stop reason: HOSPADM

## 2025-06-20 RX ORDER — IPRATROPIUM BROMIDE AND ALBUTEROL SULFATE 2.5; .5 MG/3ML; MG/3ML
3 SOLUTION RESPIRATORY (INHALATION) ONCE
Status: COMPLETED | OUTPATIENT
Start: 2025-06-20 | End: 2025-06-20

## 2025-06-20 RX ORDER — ACETAMINOPHEN 325 MG/1
650 TABLET ORAL EVERY 8 HOURS PRN
Status: DISCONTINUED | OUTPATIENT
Start: 2025-06-20 | End: 2025-06-27 | Stop reason: HOSPADM

## 2025-06-20 RX ORDER — SODIUM CHLORIDE 0.9 % (FLUSH) 0.9 %
10 SYRINGE (ML) INJECTION
Status: DISCONTINUED | OUTPATIENT
Start: 2025-06-20 | End: 2025-06-23

## 2025-06-20 RX ORDER — SODIUM CHLORIDE, SODIUM LACTATE, POTASSIUM CHLORIDE, CALCIUM CHLORIDE 600; 310; 30; 20 MG/100ML; MG/100ML; MG/100ML; MG/100ML
INJECTION, SOLUTION INTRAVENOUS CONTINUOUS
Status: DISCONTINUED | OUTPATIENT
Start: 2025-06-20 | End: 2025-06-24

## 2025-06-20 RX ORDER — DOXYCYCLINE HYCLATE 100 MG
100 TABLET ORAL EVERY 12 HOURS
Status: DISCONTINUED | OUTPATIENT
Start: 2025-06-21 | End: 2025-06-27 | Stop reason: HOSPADM

## 2025-06-20 RX ORDER — CEFTRIAXONE 1 G/1
1 INJECTION, POWDER, FOR SOLUTION INTRAMUSCULAR; INTRAVENOUS
Status: DISCONTINUED | OUTPATIENT
Start: 2025-06-21 | End: 2025-06-21

## 2025-06-20 RX ORDER — IPRATROPIUM BROMIDE AND ALBUTEROL SULFATE 2.5; .5 MG/3ML; MG/3ML
3 SOLUTION RESPIRATORY (INHALATION)
Status: DISCONTINUED | OUTPATIENT
Start: 2025-06-20 | End: 2025-06-21

## 2025-06-20 RX ORDER — SODIUM CHLORIDE 0.9 % (FLUSH) 0.9 %
10 SYRINGE (ML) INJECTION
Status: DISCONTINUED | OUTPATIENT
Start: 2025-06-20 | End: 2025-06-27 | Stop reason: HOSPADM

## 2025-06-20 RX ORDER — HYDRALAZINE HYDROCHLORIDE 20 MG/ML
10 INJECTION INTRAMUSCULAR; INTRAVENOUS EVERY 6 HOURS PRN
Status: DISCONTINUED | OUTPATIENT
Start: 2025-06-20 | End: 2025-06-27 | Stop reason: HOSPADM

## 2025-06-20 RX ORDER — ONDANSETRON HYDROCHLORIDE 2 MG/ML
4 INJECTION, SOLUTION INTRAVENOUS EVERY 8 HOURS PRN
Status: DISCONTINUED | OUTPATIENT
Start: 2025-06-20 | End: 2025-06-27 | Stop reason: HOSPADM

## 2025-06-20 RX ORDER — POLYETHYLENE GLYCOL 3350 17 G/17G
17 POWDER, FOR SOLUTION ORAL DAILY
Status: DISCONTINUED | OUTPATIENT
Start: 2025-06-21 | End: 2025-06-27 | Stop reason: HOSPADM

## 2025-06-20 RX ORDER — IPRATROPIUM BROMIDE AND ALBUTEROL SULFATE 2.5; .5 MG/3ML; MG/3ML
3 SOLUTION RESPIRATORY (INHALATION) EVERY 4 HOURS PRN
Status: DISCONTINUED | OUTPATIENT
Start: 2025-06-20 | End: 2025-06-20

## 2025-06-20 RX ORDER — GUAIFENESIN 600 MG/1
600 TABLET, EXTENDED RELEASE ORAL 2 TIMES DAILY
Status: DISCONTINUED | OUTPATIENT
Start: 2025-06-20 | End: 2025-06-27 | Stop reason: HOSPADM

## 2025-06-20 RX ORDER — IBUPROFEN 600 MG/1
600 TABLET, FILM COATED ORAL ONCE
Status: COMPLETED | OUTPATIENT
Start: 2025-06-20 | End: 2025-06-20

## 2025-06-20 RX ORDER — IPRATROPIUM BROMIDE AND ALBUTEROL SULFATE 2.5; .5 MG/3ML; MG/3ML
6 SOLUTION RESPIRATORY (INHALATION) ONCE
Status: COMPLETED | OUTPATIENT
Start: 2025-06-20 | End: 2025-06-20

## 2025-06-20 RX ORDER — PREDNISONE 20 MG/1
40 TABLET ORAL DAILY
Status: DISCONTINUED | OUTPATIENT
Start: 2025-06-21 | End: 2025-06-23

## 2025-06-20 RX ORDER — TALC
6 POWDER (GRAM) TOPICAL NIGHTLY PRN
Status: DISCONTINUED | OUTPATIENT
Start: 2025-06-20 | End: 2025-06-27 | Stop reason: HOSPADM

## 2025-06-20 RX ORDER — DEXAMETHASONE SODIUM PHOSPHATE 4 MG/ML
8 INJECTION, SOLUTION INTRA-ARTICULAR; INTRALESIONAL; INTRAMUSCULAR; INTRAVENOUS; SOFT TISSUE
Status: COMPLETED | OUTPATIENT
Start: 2025-06-20 | End: 2025-06-20

## 2025-06-20 RX ADMIN — AZITHROMYCIN MONOHYDRATE 500 MG: 500 INJECTION, POWDER, LYOPHILIZED, FOR SOLUTION INTRAVENOUS at 04:06

## 2025-06-20 RX ADMIN — GUAIFENESIN 600 MG: 600 TABLET, EXTENDED RELEASE ORAL at 10:06

## 2025-06-20 RX ADMIN — IPRATROPIUM BROMIDE AND ALBUTEROL SULFATE 6 ML: .5; 3 SOLUTION RESPIRATORY (INHALATION) at 02:06

## 2025-06-20 RX ADMIN — SODIUM CHLORIDE 500 ML: 9 INJECTION, SOLUTION INTRAVENOUS at 04:06

## 2025-06-20 RX ADMIN — DEXAMETHASONE SODIUM PHOSPHATE 8 MG: 4 INJECTION INTRA-ARTICULAR; INTRALESIONAL; INTRAMUSCULAR; INTRAVENOUS; SOFT TISSUE at 02:06

## 2025-06-20 RX ADMIN — SODIUM CHLORIDE, POTASSIUM CHLORIDE, SODIUM LACTATE AND CALCIUM CHLORIDE: 600; 310; 30; 20 INJECTION, SOLUTION INTRAVENOUS at 08:06

## 2025-06-20 RX ADMIN — SODIUM CHLORIDE 1000 ML: 9 INJECTION, SOLUTION INTRAVENOUS at 03:06

## 2025-06-20 RX ADMIN — IPRATROPIUM BROMIDE AND ALBUTEROL SULFATE 3 ML: .5; 3 SOLUTION RESPIRATORY (INHALATION) at 06:06

## 2025-06-20 RX ADMIN — CEFTRIAXONE SODIUM 1 G: 1 INJECTION, POWDER, FOR SOLUTION INTRAMUSCULAR; INTRAVENOUS at 04:06

## 2025-06-20 RX ADMIN — IBUPROFEN 600 MG: 600 TABLET, FILM COATED ORAL at 10:06

## 2025-06-20 RX ADMIN — IOHEXOL 100 ML: 350 INJECTION, SOLUTION INTRAVENOUS at 06:06

## 2025-06-20 NOTE — ED PROVIDER NOTES
Encounter Date: 6/20/2025    I, Xu Gale MD, did conduct a face to face encounter with this patient initially seen by our advanced practice provider. I did perform a history and physical, did direct the evaluation and management, and do agree with the care as documented.         History     Chief Complaint   Patient presents with    Shortness of Breath     X1 week, Hx. Of COPD, pt refusing to get out of bed having urinary accidents, was ambulating to stretcher, coughing green sputum on arrival, states family was all dx with the flu x1 weeks ago     70-year-old female with a history of COPD & hypertension, presents to the emergency department with reports of coughing up green sputum, decreased appetite, fatigue, shortness of breath x 1 week.  Patient states she urinates on herself when coughing.  She reports her whole family was diagnosed with the flu 2 weeks ago.  Patient states she feels too weak to get out of bed.  She denies fever, chest pain, dysuria.    The history is provided by the patient. No  was used.     Review of patient's allergies indicates:  No Known Allergies  Past Medical History:   Diagnosis Date    COPD (chronic obstructive pulmonary disease)     HTN (hypertension)     Tobacco use      Past Surgical History:   Procedure Laterality Date    APPENDECTOMY       Family History   Problem Relation Name Age of Onset    Heart failure Mother      Heart failure Father      Kidney disease Father      Heart disease Father      Obesity Sister      Diabetes Sister      Liver cancer Brother       Social History[1]  Review of Systems   Constitutional:  Positive for appetite change and fatigue. Negative for chills and fever.   Eyes: Negative.    Respiratory:  Positive for cough and shortness of breath. Negative for chest tightness.    Cardiovascular:  Negative for palpitations.   Gastrointestinal:  Negative for abdominal pain, diarrhea, nausea and vomiting.   Genitourinary:  Negative  for dysuria.   Skin:  Negative for rash and wound.       Physical Exam     Initial Vitals [06/20/25 1351]   BP Pulse Resp Temp SpO2   137/83 (!) 112 (!) 24 98.2 °F (36.8 °C) (!) 89 %      MAP       --         Physical Exam    Nursing note and vitals reviewed.  HENT:   Head: Normocephalic and atraumatic.   Cardiovascular:  Normal rate, regular rhythm, normal heart sounds and intact distal pulses.           Pulmonary/Chest: She has wheezes (Expiratory).   Abdominal: Abdomen is soft. Bowel sounds are normal.   Musculoskeletal:         General: Normal range of motion.     Neurological: She is alert.   Skin: Skin is warm. Capillary refill takes less than 2 seconds.         ED Course   Procedures  Labs Reviewed   COMPREHENSIVE METABOLIC PANEL - Abnormal       Result Value    Sodium 134 (*)     Potassium 3.6      Chloride 93 (*)     CO2 29      Glucose 129 (*)     Blood Urea Nitrogen 22.9 (*)     Creatinine 0.66      Calcium 8.4      Protein Total 7.0      Albumin 2.5 (*)     Globulin 4.5 (*)     Albumin/Globulin Ratio 0.6 (*)     Bilirubin Total 0.2            ALT 19      AST 16      eGFR >60      Anion Gap 12.0      BUN/Creatinine Ratio 35     URINALYSIS, REFLEX TO URINE CULTURE - Abnormal    Color, UA Light-Yellow      Appearance, UA Clear      Specific Gravity, UA 1.045 (*)     pH, UA 7.0      Protein, UA Negative      Glucose, UA Normal      Ketones, UA 1+ (*)     Blood, UA Negative      Bilirubin, UA Negative      Urobilinogen, UA Normal      Nitrites, UA Negative      Leukocyte Esterase, UA Negative      RBC, UA 0-5      WBC, UA 0-5      Bacteria, UA None Seen      Squamous Epithelial Cells, UA Trace (*)     Hyaline Casts, UA None Seen     CBC WITH DIFFERENTIAL - Abnormal    WBC 19.87 (*)     RBC 4.75      Hgb 13.8      Hct 42.2      MCV 88.8      MCH 29.1      MCHC 32.7 (*)     RDW 13.9      Platelet 511 (*)     MPV 8.9      Neut % 86.1      Lymph % 6.5      Mono % 6.5      Eos % 0.0      Basophil % 0.2       Imm Grans % 0.7      Neut # 17.12 (*)     Lymph # 1.29      Mono # 1.30      Eos # 0.00      Baso # 0.03      Imm Gran # 0.13 (*)     NRBC% 0.0     LACTIC ACID, PLASMA - Abnormal    Lactic Acid Level 2.6 (*)    LACTIC ACID, PLASMA - Abnormal    Lactic Acid Level 3.3 (*)    COVID/RSV/FLU A&B PCR - Normal    Influenza A PCR Not Detected      Influenza B PCR Not Detected      Respiratory Syncytial Virus PCR Not Detected      SARS-CoV-2 PCR Not Detected      Narrative:     The Xpert Xpress SARS-CoV-2/FLU/RSV plus is a rapid, multiplexed real-time PCR test intended for the simultaneous qualitative detection and differentiation of SARS-CoV-2, Influenza A, Influenza B, and respiratory syncytial virus (RSV) viral RNA in either nasopharyngeal swab or nasal swab specimens.         BLOOD CULTURE OLG   BLOOD CULTURE [facility]   GRAM STAIN [facility]   CBC W/ AUTO DIFFERENTIAL    Narrative:     The following orders were created for panel order CBC Auto Differential.  Procedure                               Abnormality         Status                     ---------                               -----------         ------                     CBC with Differential[[id number]]       Abnormal            Final result                 Please view results for these tests on the individual orders.   EXTRA TUBES    Narrative:     The following orders were created for panel order EXTRA TUBES.  Procedure                               Abnormality         Status                     ---------                               -----------         ------                     Light Blue Top Hold[[id number]]                             Final result               Light Green Top Hold[[id number]]                            Final result               Lavender Top Hold[[id number]]                               Final result               Gold Top Hold[[id number]]                                   Final result                 Please view results for these tests on the  individual orders.   LIGHT BLUE TOP HOLD    Extra Tube Hold for add-ons.     LIGHT GREEN TOP HOLD    Extra Tube Hold for add-ons.     LAVENDER TOP HOLD    Extra Tube Hold for add-ons.     GOLD TOP HOLD    Extra Tube Hold for add-ons.     EXTRA TUBES    Narrative:     The following orders were created for panel order EXTRA TUBES.  Procedure                               Abnormality         Status                     ---------                               -----------         ------                     Light Green Top Hold[[id number]]                            Final result                 Please view results for these tests on the individual orders.   LIGHT GREEN TOP HOLD    Extra Tube Hold for add-ons.          ECG Results              EKG 12-LEAD (In process)        Collection Time Result Time QRS Duration [facility] QTC Calculation    06/20/25 21:28:05 06/21/25 07:18:11 88 481                     In process by Interface, Lab In [facility] (06/21/25 07:18:17)                   Narrative:    Test Reason : R06.02,    Vent. Rate : 115 BPM     Atrial Rate : 115 BPM     P-R Int : 138 ms          QRS Dur :  88 ms      QT Int : 348 ms       P-R-T Axes :  85 -16 110 degrees    QTcB Int : 481 ms    Sinus tachycardia  Right atrial enlargement  Septal infarct (cited on or before 04-May-2023)  T wave abnormality, consider anterolateral ischemia  Abnormal ECG  When compared with ECG of 19-Nov-2024 18:27,  ST no longer depressed in Anterior leads  T wave inversion now evident in Anterior-lateral leads    Referred By: AAAREFERRAL SELF           Confirmed By:                                   Imaging Results              CT Chest With Contrast (Final result)  Result time 06/20/25 18:12:32      Final result by Emerson Anaya MD (06/20/25 18:12:32)                   Impression:      1. Scattered opacities in both lungs favored infectious. Recommend a follow-up chest CT in 3 months to monitor.  2. Thoracic aortic aneurysms as  discussed.      Electronically signed by: Emerson Anaya  Date:    06/20/2025  Time:    18:12               Narrative:    EXAMINATION:  CT CHEST WITH CONTRAST    CLINICAL HISTORY:  Cough, persistent;    TECHNIQUE:  Helical acquisition through the chest performed with IV contrast. Three plane reconstructions made available for review. DLP 63 mGycm. Automatic exposure control, adjustment of mA/kV or iterative reconstruction technique was used to reduce radiation.    COMPARISON:  No prior chest CT    FINDINGS:  There are no significantly enlarged thoracic lymph nodes.    The heart is not significantly enlarged.  No pericardial effusion.  There are coronary artery calcifications.  There is dilatation of the ascending thoracic aorta up to 4.5 cm.  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  There is also aneurysm of the descending thoracic aorta measuring up to 5.1 cm on image 39 series 2.  Large amount of soft plaque within this aneurysm.    There is no pleural effusion.  There is a subpleural area of consolidation in the left lower lobe on image 45 series 8 measuring up to 2-3 cm.  There are patchy nodular and tree-in-bud opacities elsewhere primarily in the left lung.  There is moderate emphysema.    There are no acute findings in the imaged upper abdomen.  There are no acute osseous findings.                                       X-Ray Chest PA And Lateral (Final result)  Result time 06/20/25 16:02:11      Final result by Humberto Murilol MD (06/20/25 16:02:11)                   Impression:      No acute pulmonary process appreciated.      Electronically signed by: Humberto Murillo  Date:    06/20/2025  Time:    16:02               Narrative:    EXAMINATION:  XR CHEST PA AND LATERAL    CLINICAL HISTORY:  Shortness of breath    TECHNIQUE:  Frontal and lateral radiographs of the chest    COMPARISON:  Radiography 11/19/2024    FINDINGS:  Normal heart size.   Tortuosity of the thoracic aorta similar since November.  The lungs are well-inflated. No consolidation identified. No pleural effusion or discernible pneumothorax.                                       Medications   lactated ringers infusion ( Intravenous New Bag 6/21/25 0536)   guaiFENesin 12 hr tablet 600 mg (600 mg Oral Given 6/20/25 2239)   sodium chloride 0.9% flush 10 mL (has no administration in time range)   sodium chloride 0.9% flush 10 mL (has no administration in time range)   sodium chloride 0.9% flush 10 mL (has no administration in time range)   melatonin tablet 6 mg (has no administration in time range)   acetaminophen tablet 650 mg (has no administration in time range)   polyethylene glycol packet 17 g (has no administration in time range)   senna-docusate 8.6-50 mg per tablet 1 tablet (1 tablet Oral Not Given 6/20/25 2130)   ondansetron injection 4 mg (has no administration in time range)   predniSONE tablet 40 mg (has no administration in time range)   albuterol-ipratropium 2.5 mg-0.5 mg/3 mL nebulizer solution 3 mL (3 mLs Nebulization Not Given 6/20/25 2045)   doxycycline tablet 100 mg (has no administration in time range)   hydrALAZINE injection 10 mg (has no administration in time range)   labetalol 20 mg/4 mL (5 mg/mL) IV syring (has no administration in time range)   cefTRIAXone injection 1 g (has no administration in time range)   ibuprofen tablet 400 mg (400 mg Oral Given 6/21/25 0534)   albuterol-ipratropium 2.5 mg-0.5 mg/3 mL nebulizer solution 6 mL (6 mLs Nebulization Given 6/20/25 1426)   dexAMETHasone injection 8 mg (8 mg Intravenous Given 6/20/25 1446)   sodium chloride 0.9% bolus 1,000 mL 1,000 mL (0 mLs Intravenous Stopped 6/20/25 1639)   cefTRIAXone injection 1 g (1 g Intravenous Given 6/20/25 1621)   azithromycin (ZITHROMAX) 500 mg in 0.9% NaCl 250 mL IVPB (admixture device) (0 mg Intravenous Stopped 6/20/25 1721)   sodium chloride 0.9% bolus 500 mL 500 mL (0 mLs Intravenous  Stopped 6/20/25 1715)   albuterol-ipratropium 2.5 mg-0.5 mg/3 mL nebulizer solution 3 mL (3 mLs Nebulization Given 6/20/25 1809)   iohexoL (OMNIPAQUE 350) injection 100 mL (100 mLs Intravenous Given 6/20/25 1801)   ibuprofen tablet 600 mg (600 mg Oral Given 6/20/25 2245)     Medical Decision Making  70-year-old female with a history of COPD & hypertension, presents to the emergency department with reports of coughing up green sputum, decreased appetite, fatigue, shortness of breath x 1 week.  Patient states she urinates on herself when coughing.  She reports her whole family was diagnosed with the flu 2 weeks ago.  Patient states she feels too weak to get out of bed.  She denies fever, chest pain, dysuria.    DDx:  Pneumonia, COPD, bronchitis, influenza, COVID    WBC 19.8.  Duo nebs and Decadron given in the ED. Elevated lactic.  IV azithromycin and Rocephin given.  IV fluids given.  Repeat lactic increase to 3.3.  CT chest shows scattered opacities in both lungs favored infectious, along with thoracic aneurysm.  Internal medicine consulted.    Amount and/or Complexity of Data Reviewed  Labs: ordered. Decision-making details documented in ED Course.  Radiology: ordered. Decision-making details documented in ED Course.  Discussion of management or test interpretation with external provider(s): I consulted Dr. Gale for a face to face evaluation with the patient prior to admission    Risk  Prescription drug management.  Decision regarding hospitalization.               ED Course as of 06/21/25 0718   Fri Jun 20, 2025   1450 WBC(!): 19.87 [ER]   1603 Lactic Acid Level(!): 2.6 [ER]   1649 Influenza A, Molecular: Not Detected [ER]   1649 Influenza B, Molecular: Not Detected [ER]   1649 RSV Ag by Molecular Method: Not Detected [ER]   1649 SARS-CoV2 (COVID-19) Qualitative PCR: Not Detected [ER]   1827 CT Chest With Contrast  1. Scattered opacities in both lungs favored infectious. Recommend a follow-up chest CT in 3  months to monitor.  2. Thoracic aortic aneurysms as discussed.   [ER]   1830 Lactic Acid Level(!): 3.3 [ER]      ED Course User Index  [ER] Griselda Liu PA                           Clinical Impression:  Final diagnoses:  [R06.02] SOB (shortness of breath) (Primary)  [J44.0, J18.9] COPD with pneumonia  [I71.23] Aneurysm of descending thoracic aorta without rupture          ED Disposition Condition    Observation                     Griselda Liu PA  06/20/25 9221         [1]   Social History  Tobacco Use    Smoking status: Every Day     Current packs/day: 0.50     Types: Cigarettes     Passive exposure: Current    Smokeless tobacco: Never   Substance Use Topics    Alcohol use: Never    Drug use: Not Currently     Types: Marijuana        Xu Gale MD  06/21/25 0718

## 2025-06-21 LAB
ALBUMIN SERPL-MCNC: 2.2 G/DL (ref 3.4–4.8)
ALBUMIN/GLOB SERPL: 0.6 RATIO (ref 1.1–2)
ALP SERPL-CCNC: 96 UNIT/L (ref 40–150)
ALT SERPL-CCNC: 17 UNIT/L (ref 0–55)
ANION GAP SERPL CALC-SCNC: 9 MEQ/L
AST SERPL-CCNC: 17 UNIT/L (ref 11–45)
BASOPHILS # BLD AUTO: 0.02 X10(3)/MCL
BASOPHILS NFR BLD AUTO: 0.1 %
BILIRUB SERPL-MCNC: 0.2 MG/DL
BUN SERPL-MCNC: 19.6 MG/DL (ref 9.8–20.1)
CALCIUM SERPL-MCNC: 7.9 MG/DL (ref 8.4–10.2)
CHLORIDE SERPL-SCNC: 102 MMOL/L (ref 98–107)
CHOLEST SERPL-MCNC: 162 MG/DL
CHOLEST/HDLC SERPL: 4 {RATIO} (ref 0–5)
CO2 SERPL-SCNC: 27 MMOL/L (ref 23–31)
CREAT SERPL-MCNC: 0.62 MG/DL (ref 0.55–1.02)
CREAT/UREA NIT SERPL: 32
EOSINOPHIL # BLD AUTO: 0 X10(3)/MCL (ref 0–0.9)
EOSINOPHIL NFR BLD AUTO: 0 %
ERYTHROCYTE [DISTWIDTH] IN BLOOD BY AUTOMATED COUNT: 14.3 % (ref 11.5–17)
EST. AVERAGE GLUCOSE BLD GHB EST-MCNC: 122.6 MG/DL
GFR SERPLBLD CREATININE-BSD FMLA CKD-EPI: >60 ML/MIN/1.73/M2
GLOBULIN SER-MCNC: 3.6 GM/DL (ref 2.4–3.5)
GLUCOSE SERPL-MCNC: 113 MG/DL (ref 82–115)
HBA1C MFR BLD: 5.9 %
HCT VFR BLD AUTO: 36 % (ref 37–47)
HDLC SERPL-MCNC: 41 MG/DL (ref 35–60)
HGB BLD-MCNC: 11.9 G/DL (ref 12–16)
IMM GRANULOCYTES # BLD AUTO: 0.16 X10(3)/MCL (ref 0–0.04)
IMM GRANULOCYTES NFR BLD AUTO: 0.8 %
LACTATE SERPL-SCNC: 1.8 MMOL/L (ref 0.5–2.2)
LDLC SERPL CALC-MCNC: 98 MG/DL (ref 50–140)
LYMPHOCYTES # BLD AUTO: 1.78 X10(3)/MCL (ref 0.6–4.6)
LYMPHOCYTES NFR BLD AUTO: 9 %
MAGNESIUM SERPL-MCNC: 2.1 MG/DL (ref 1.6–2.6)
MCH RBC QN AUTO: 29.6 PG (ref 27–31)
MCHC RBC AUTO-ENTMCNC: 33.1 G/DL (ref 33–36)
MCV RBC AUTO: 89.6 FL (ref 80–94)
MONOCYTES # BLD AUTO: 1.76 X10(3)/MCL (ref 0.1–1.3)
MONOCYTES NFR BLD AUTO: 8.9 %
MRSA PCR SCRN (OHS): NOT DETECTED
NEUTROPHILS # BLD AUTO: 16.11 X10(3)/MCL (ref 2.1–9.2)
NEUTROPHILS NFR BLD AUTO: 81.2 %
NRBC BLD AUTO-RTO: 0 %
PHOSPHATE SERPL-MCNC: 2.3 MG/DL (ref 2.3–4.7)
PLATELET # BLD AUTO: 460 X10(3)/MCL (ref 130–400)
PMV BLD AUTO: 9.2 FL (ref 7.4–10.4)
POTASSIUM SERPL-SCNC: 4 MMOL/L (ref 3.5–5.1)
PROT SERPL-MCNC: 5.8 GM/DL (ref 5.8–7.6)
RBC # BLD AUTO: 4.02 X10(6)/MCL (ref 4.2–5.4)
SODIUM SERPL-SCNC: 138 MMOL/L (ref 136–145)
TRIGL SERPL-MCNC: 115 MG/DL (ref 37–140)
VLDLC SERPL CALC-MCNC: 23 MG/DL
WBC # BLD AUTO: 19.83 X10(3)/MCL (ref 4.5–11.5)

## 2025-06-21 PROCEDURE — 83735 ASSAY OF MAGNESIUM: CPT

## 2025-06-21 PROCEDURE — 27000207 HC ISOLATION

## 2025-06-21 PROCEDURE — 21400001 HC TELEMETRY ROOM

## 2025-06-21 PROCEDURE — 25000003 PHARM REV CODE 250

## 2025-06-21 PROCEDURE — 94664 DEMO&/EVAL PT USE INHALER: CPT

## 2025-06-21 PROCEDURE — 25000242 PHARM REV CODE 250 ALT 637 W/ HCPCS

## 2025-06-21 PROCEDURE — 85025 COMPLETE CBC W/AUTO DIFF WBC: CPT

## 2025-06-21 PROCEDURE — 99900035 HC TECH TIME PER 15 MIN (STAT)

## 2025-06-21 PROCEDURE — 83036 HEMOGLOBIN GLYCOSYLATED A1C: CPT

## 2025-06-21 PROCEDURE — 63600175 PHARM REV CODE 636 W HCPCS

## 2025-06-21 PROCEDURE — 94761 N-INVAS EAR/PLS OXIMETRY MLT: CPT

## 2025-06-21 PROCEDURE — 84100 ASSAY OF PHOSPHORUS: CPT

## 2025-06-21 PROCEDURE — 36415 COLL VENOUS BLD VENIPUNCTURE: CPT

## 2025-06-21 PROCEDURE — 80053 COMPREHEN METABOLIC PANEL: CPT

## 2025-06-21 PROCEDURE — 94667 MNPJ CHEST WALL 1ST: CPT

## 2025-06-21 PROCEDURE — 94640 AIRWAY INHALATION TREATMENT: CPT | Mod: XB

## 2025-06-21 RX ORDER — IBUPROFEN 400 MG/1
400 TABLET, FILM COATED ORAL EVERY 6 HOURS PRN
Status: DISCONTINUED | OUTPATIENT
Start: 2025-06-21 | End: 2025-06-27 | Stop reason: HOSPADM

## 2025-06-21 RX ORDER — IPRATROPIUM BROMIDE AND ALBUTEROL SULFATE 2.5; .5 MG/3ML; MG/3ML
3 SOLUTION RESPIRATORY (INHALATION) EVERY 6 HOURS
Status: DISCONTINUED | OUTPATIENT
Start: 2025-06-21 | End: 2025-06-25

## 2025-06-21 RX ADMIN — IPRATROPIUM BROMIDE AND ALBUTEROL SULFATE 3 ML: .5; 3 SOLUTION RESPIRATORY (INHALATION) at 01:06

## 2025-06-21 RX ADMIN — SENNOSIDES AND DOCUSATE SODIUM 1 TABLET: 50; 8.6 TABLET ORAL at 08:06

## 2025-06-21 RX ADMIN — IPRATROPIUM BROMIDE AND ALBUTEROL SULFATE 3 ML: .5; 3 SOLUTION RESPIRATORY (INHALATION) at 07:06

## 2025-06-21 RX ADMIN — SODIUM CHLORIDE, POTASSIUM CHLORIDE, SODIUM LACTATE AND CALCIUM CHLORIDE: 600; 310; 30; 20 INJECTION, SOLUTION INTRAVENOUS at 09:06

## 2025-06-21 RX ADMIN — SODIUM CHLORIDE, POTASSIUM CHLORIDE, SODIUM LACTATE AND CALCIUM CHLORIDE: 600; 310; 30; 20 INJECTION, SOLUTION INTRAVENOUS at 05:06

## 2025-06-21 RX ADMIN — IBUPROFEN 400 MG: 400 TABLET, FILM COATED ORAL at 09:06

## 2025-06-21 RX ADMIN — DOXYCYCLINE HYCLATE 100 MG: 100 TABLET, COATED ORAL at 08:06

## 2025-06-21 RX ADMIN — GUAIFENESIN 600 MG: 600 TABLET, EXTENDED RELEASE ORAL at 08:06

## 2025-06-21 RX ADMIN — POLYETHYLENE GLYCOL 3350 17 G: 17 POWDER, FOR SOLUTION ORAL at 08:06

## 2025-06-21 RX ADMIN — PREDNISONE 40 MG: 20 TABLET ORAL at 08:06

## 2025-06-21 RX ADMIN — IBUPROFEN 400 MG: 400 TABLET, FILM COATED ORAL at 05:06

## 2025-06-21 NOTE — PROGRESS NOTES
[facility] Wards   Progress Note      Resident Team: [facility] Medicine List 2  Attending Physician: Diana Cortes MD  Resident: Hoda  Intern: [facility] Length of Stay: 0 days    Subjective   Brief HPI:  Kristen Crowley is a 70 y.o. female with a PMHx of COPD, hypertension, and tobacco use who presents to [facility] on 06/20/2025 for shortness of breath, cough productive of thick, green sputum, fatigue, chills, decreased appetite, and generalized weakness x1 week.  Patient reports several members of her family have been diagnosed with flu over the last 1-2 weeks.  She reports that for the last several months she has been experiencing intermittent shortness of breaths and upper respiratory symptoms.  Over the past week, she states that she has been too weak to get out of bed to walk to the kitchen or the bathroom and reports episodes of urinary incontinence with coughing which is what prompted her to present to the ED today.  She has been using her albuterol and DuoNebs without much improvement in symptoms.  Not currently using a maintenance inhaler, although she has previously used Spiriva and Breztri in the past.  She denies fever, headache, unexpected weight loss, chest pain, palpitations rhinorrhea, sore throat, congestion, ear pain, nausea, vomiting, abdominal pain, dysuria, constipation, diarrhea, or focal weakness.  She admits to smoking about 1 ppd for approximately 50 years, but quit 2 months ago.  Reports occasional marijuana use, but denies any other illicit drug use.  Denies alcohol use.     ED Course:  Initial vitals significant for /83, , RR 24, SpO2 89% on room air.  Afebrile.  Patient received DuoNebs treatment upon arrival to the ED resulting in improvement in SpO2 to >92%.  Decadron 8 mg administered as well.  CBC significant for leukocytosis (WBC 19.87) with left shift and thrombocytosis (plt [phone]).  CMP notable for Na 134, Cl 93, and albumin 2.5.  CO2 29 indicating patient  more than likely has a chronic respiratory acidosis.  Initial lactic acid 2.6; repeat up-trended to 3.3.  CXR revealed hyperinflated lungs without evidence of focal consolidations or pleural effusions.  CT chest with contrast revealed scattered opacities in bilateral lung fields and an area of consolidation in the LLL favoring an infectious process with underlying emphysema.  Incidental thoracic aortic aneurysms noted as well.  Per sepsis protocol, patient received 1.5 L bolus of NS.  Blood cultures x2 were collected and patient was given Rocephin 1 g IV and Azithromycin 500 mg IV.  Internal Medicine was consulted for further evaluation and management of severe sepsis and CAP.    Interval History: Patient sitting up in bed, no acute distress. States initial symptoms somewhat improved, but still persistent. Reports a productive cough, with thinning of mucus since starting Mucinex. Some shortness of breath at baseline, not requiring supplemental O2, satting well on RA. Denies any fevers, CP, N/V, diarrhea. Some urinary incontinence with coughing.    Review of Systems:  Review of Systems   Constitutional:  Negative for fever.   HENT:  Positive for sore throat. Negative for congestion.    Respiratory:  Positive for cough, sputum production and shortness of breath.    Cardiovascular:  Negative for chest pain.   Gastrointestinal:  Negative for abdominal pain, diarrhea, nausea and vomiting.   Neurological:  Positive for weakness (generalized). Negative for dizziness and headaches.     Objective     Vital Signs (Most Recent):  Temp: 97.6 °F (36.4 °C) (06/21/25 0721)  Pulse: 78 (06/21/25 0721)  Resp: 18 (06/21/25 0721)  BP: 116/68 (06/21/25 0721)  SpO2: 95 % (06/21/25 0721) Vital Signs (24h Range):  Temp:  [97.4 °F (36.3 °C)-98.2 °F (36.8 °C)] 97.6 °F (36.4 °C)  Pulse:  [] 78  Resp:  [18-43] 18  SpO2:  [89 %-100 %] 95 %  BP: (104-154)/(67-85) 116/68     Physical Examination:  Physical Exam  Constitutional:        General: She is not in acute distress.     Appearance: She is not ill-appearing.   HENT:      Head: Normocephalic and atraumatic.      Nose: No congestion.      Mouth/Throat:      Mouth: Mucous membranes are moist.      Pharynx: Oropharynx is clear.   Eyes:      Conjunctiva/sclera: Conjunctivae normal.      Pupils: Pupils are equal, round, and reactive to light.   Cardiovascular:      Rate and Rhythm: Normal rate and regular rhythm.      Pulses: Normal pulses.      Heart sounds: Normal heart sounds.   Pulmonary:      Effort: Pulmonary effort is normal. No respiratory distress.      Breath sounds: Decreased breath sounds (bilateral lung fields) and wheezing (expiratory at lung bases) present. No rhonchi.      Comments: No increased work of breathing, on RA  Abdominal:      General: Bowel sounds are normal. There is no distension.      Palpations: Abdomen is soft.      Tenderness: There is no abdominal tenderness.   Musculoskeletal:         General: No swelling or tenderness.   Skin:     General: Skin is warm and dry.      Capillary Refill: Capillary refill takes less than 2 seconds.   Neurological:      General: No focal deficit present.      Mental Status: She is alert.   Psychiatric:         Mood and Affect: Mood normal.       Laboratory:  Most Recent Data:  CBC:   Recent Labs   Lab 06/20/25  1424 06/21/25  0507   WBC 19.87* 19.83*   HGB 13.8 11.9*   HCT 42.2 36.0*   * 460*     CMP:   Recent Labs   Lab 06/21/25  0507      CALCIUM 7.9*   ALBUMIN 2.2*   PROT 5.8      K 4.0   CO2 27      BUN 19.6   CREATININE 0.62   ALKPHOS 96   ALT 17   AST 17   BILITOT 0.2       Recent Labs   Lab 06/20/25  2351   CHOL 162   TRIG 115   HDL 41   LDL 98.00     Hemoglobin A1c   Date Value Ref Range Status   06/21/2025 5.9 <=7.0 % Final     Microbiology Data:  Microbiology Results (last 7 days)       Procedure Component Value Units Date/Time    Gram Stain [[id number]] Collected: 06/20/25 2122    Order Status:  Sent Specimen: Sputum, Expectorated Updated: 06/20/25 2122    Respiratory Culture [[id number]] Collected: 06/20/25 2121    Order Status: Sent Specimen: Sputum, Expectorated Updated: 06/20/25 2121    Respiratory Culture [[id number]]     Order Status: Canceled Specimen: Sputum, Expectorated     Respiratory Culture [[id number]]     Order Status: Canceled Specimen: Sputum, Expectorated     Blood Culture [[id number]] Collected: 06/20/25 1524    Order Status: Resulted Specimen: Blood from Arm, Right Updated: 06/20/25 1535    Blood Culture [[id number]] Collected: 06/20/25 1524    Order Status: Resulted Specimen: Blood from Arm, Left Updated: 06/20/25 1535          Respiratory Panel      Component  Ref Range & Units (hover) 1 d ago   Adenovirus Not Detected   Coronavirus 229E Not Detected   Coronavirus HKU1 Not Detected   Coronavirus NL63 Not Detected   Coronavirus OC43 PCR, Common Cold Virus Not Detected   Human Metapneumovirus Not Detected   Parainfluenza Virus 1 Not Detected   Parainfluenza Virus 2 Not Detected   Parainfluenza Virus 3 Not Detected   Parainfluenza Virus 4 Not Detected   Bordetella pertussis (ptxP) Not Detected   Chlamydia pneumoniae Not Detected   Mycoplasma pneumoniae Not Detected   Human Rhinovirus/Enterovirus Detected Abnormal    Bordetella parapertussis ([id number]) Not Detected          Other Results:  EKG:   Results for orders placed or performed during the hospital encounter of 06/20/25   EKG 12-LEAD    Collection Time: 06/20/25  9:28 PM   Result Value Ref Range    QRS Duration 88 ms    [facility] QTC Calculation 481 ms    Narrative    Test Reason : R06.02,    Vent. Rate : 115 BPM     Atrial Rate : 115 BPM     P-R Int : 138 ms          QRS Dur :  88 ms      QT Int : 348 ms       P-R-T Axes :  85 -16 110 degrees    QTcB Int : 481 ms    Sinus tachycardia  Right atrial enlargement  Septal infarct (cited on or before 04-May-2023)  T wave abnormality, consider anterolateral ischemia  Abnormal ECG  When compared  with ECG of 19-Nov-2024 18:27,  ST no longer depressed in Anterior leads  T wave inversion now evident in Anterior-lateral leads    Referred By: AAAREFERRAL SELF           Confirmed By:          Radiology:  Imaging Results              CT Chest With Contrast (Final result)  Result time 06/20/25 18:12:32      Final result by Emerson Anaya MD (06/20/25 18:12:32)                   Impression:      1. Scattered opacities in both lungs favored infectious. Recommend a follow-up chest CT in 3 months to monitor.  2. Thoracic aortic aneurysms as discussed.      Electronically signed by: Emerson Anaya  Date:    06/20/2025  Time:    18:12               Narrative:    EXAMINATION:  CT CHEST WITH CONTRAST    CLINICAL HISTORY:  Cough, persistent;    TECHNIQUE:  Helical acquisition through the chest performed with IV contrast. Three plane reconstructions made available for review. DLP 63 mGycm. Automatic exposure control, adjustment of mA/kV or iterative reconstruction technique was used to reduce radiation.    COMPARISON:  No prior chest CT    FINDINGS:  There are no significantly enlarged thoracic lymph nodes.    The heart is not significantly enlarged.  No pericardial effusion.  There are coronary artery calcifications.  There is dilatation of the ascending thoracic aorta up to 4.5 cm.  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  There is also aneurysm of the descending thoracic aorta measuring up to 5.1 cm on image 39 series 2.  Large amount of soft plaque within this aneurysm.    There is no pleural effusion.  There is a subpleural area of consolidation in the left lower lobe on image 45 series 8 measuring up to 2-3 cm.  There are patchy nodular and tree-in-bud opacities elsewhere primarily in the left lung.  There is moderate emphysema.    There are no acute findings in the imaged upper abdomen.  There are no acute osseous findings.                                        X-Ray Chest PA And Lateral (Final result)  Result time 06/20/25 16:02:11      Final result by Humberto Murillo MD (06/20/25 16:02:11)                   Impression:      No acute pulmonary process appreciated.      Electronically signed by: Humberto Murillo  Date:    06/20/2025  Time:    16:02               Narrative:    EXAMINATION:  XR CHEST PA AND LATERAL    CLINICAL HISTORY:  Shortness of breath    TECHNIQUE:  Frontal and lateral radiographs of the chest    COMPARISON:  Radiography 11/19/2024    FINDINGS:  Normal heart size.  Tortuosity of the thoracic aorta similar since November.  The lungs are well-inflated. No consolidation identified. No pleural effusion or discernible pneumothorax.                                      Current Medications:     Infusions:   lactated ringers   Intravenous Continuous 85 mL/hr at 06/21/25 0536 New Bag at 06/21/25 0536        Scheduled:   albuterol-ipratropium  3 mL Nebulization Q6H    doxycycline  100 mg Oral Q12H    guaiFENesin  600 mg Oral BID    polyethylene glycol  17 g Oral Daily    predniSONE  40 mg Oral Daily    senna-docusate  1 tablet Oral BID        PRN:    Current Facility-Administered Medications:     acetaminophen, 650 mg, Oral, Q8H PRN    hydrALAZINE, 10 mg, Intravenous, Q6H PRN    ibuprofen, 400 mg, Oral, Q6H PRN    labetalol, 10 mg, Intravenous, Q6H PRN    melatonin, 6 mg, Oral, Nightly PRN    ondansetron, 4 mg, Intravenous, Q8H PRN    sodium chloride 0.9%, 10 mL, Intravenous, PRN    sodium chloride 0.9%, 10 mL, Intravenous, PRN    sodium chloride 0.9%, 10 mL, Intravenous, PRN    Antibiotics and Day Number of Therapy:  One dose of Azithromycin and Rocephin given in ED. Initiated on Doxycycline    Intake/Output Summary (Last 24 hours) at 6/21/2025 0954  Last data filed at 6/21/2025 0439  Gross per 24 hour   Intake --   Output 300 ml   Net -300 ml       Lines/Drains/Airways       Drain  Duration             Female External Urinary Catheter w/ Suction 06/20/25  2117 <1 day              Peripheral Intravenous Line  Duration             Peripheral IV 06/20/25 1413 20 G Left Antecubital <1 day                    Assessment and Plan    Severe sepsis with lactic acidosis   CAP of the LLL, non-severe   History of COPD  History of tobacco use  - SIRS 3/4 (, RR 24, WBC 19.87)  - CXR revealed hyperinflated lungs without evidence of focal consolidations or pleural effusions.    - CT chest with contrast revealed scattered opacities in bilateral lung fields and an area of consolidation in the LLL favoring an infectious process with underlying emphysema.   - Received DuoNeb treatments x2 and Decadron 8 mg in the ED  - Sepsis protocol initiated in the ED. Patient received 30 cc/kg Rocephin 1 g IV and Azithromycin 500 mg IV.  - COVID/FLU/RSV negative.  Respiratory panel positive for Rhinovirus. MRSA PCR negative. Blood cultures x 2, Respiratory culture and Gram stain collected and pending.  - Will continue treating for non-severe CAP and atypical organisms with Doxycycline 100 mg PO BID.  Azithromycin and Levaquin contraindicated given QTc prolongation.  - Initial lactic acid 2.6, repeat uptrended to 3.3. Following IVF resuscitation improved to 1.8.   - Received Decadron 8 mg in the ED. Continue with prednisone 40 mg daily.   - DuoNebs and incentive spirometry q4h while awake.  Mucinex BID.   - Supplemental oxygen as needed to maintain SpO2 88-92%.  Continuous pulse oximetry.     Thoracic aortic aneurysms   Prolonged QTc  History of hypertension  History of hyperlipidemia  - Incidental thoracic aortic aneurysms noted on CT chest.  Ascending thoracic aortic dilation measuring 4.5 cm; saccular aneurysm off of the distal aortic arch measuring about 2 cm; descending thoracic aortic aneurysm measuring 5.1 cm.  No previous CTs on file for comparison.  - Patient will need repeat imaging in approximately 6 months for continued monitoring of thoracic aortic aneurysms.  - Abdominal  ultrasound to screen for AAA on Monday or schedule OP  - EKG revealed sinus tachycardia with prolonged QTc 481. Avoid QT prolonging agents.   - /83 upon arrival.  Not currently taking any antihypertensive medications.  - PRN antihypertensives ordered  - Lipid panel wnl and A1c 5.9       CODE STATUS: Full  Access: pIV  Antibiotics: Rocephin/Doxycycline  Diet: Regular  DVT Prophylaxis: SCDs  GI Prophylaxis: --  Fluids: LR 85 cc/hr      Disposition: 70-year-old female admitted for severe sepsis secondary to non-severe community-acquired pneumonia.  Remains hemodynamically stable and breathing comfortably on room air.  Treating with Doxycycline, and steroids given history of COPD.  Lactate normalized. Continue scheduled DuoNebs and incentive spirometry.  Discharge pending resolution of lactic acidosis and clinical improvement.       Renetta Young DO  [facility] HO-1

## 2025-06-21 NOTE — H&P
[facility]  HISTORY & PHYSICAL NOTE    Patient's Name: Kristen Crowley  : 1955  MRN: [id number]    Admission Date: 2025  Attending Physician: Diana Cortes MD  Resident: Damaso Burciaga MD  Intern: Sera Grant MD  Primary Care Provider: No, Primary Doctor    SUBJECTIVE     Chief Complaint   Patient presents with    Shortness of Breath     X1 week, Hx. Of COPD, pt refusing to get out of bed having urinary accidents, was ambulating to stretcher, coughing green sputum on arrival, states family was all dx with the flu x1 weeks ago     History of Present Illness:  Kristen Crowley is a 70 y.o. female with a PMHx of COPD, hypertension, and tobacco use who presents to [facility] on 2025 for shortness of breath, cough productive of thick, green sputum, fatigue, chills, decreased appetite, and generalized weakness x1 week.  Patient reports several members of her family have been diagnosed with flu over the last 1-2 weeks.  She reports that for the last several months she has been experiencing intermittent shortness of breaths and upper respiratory symptoms.  Over the past week, she states that she has been too weak to get out of bed to walk to the kitchen or the bathroom and reports episodes of urinary incontinence with coughing which is what prompted her to present to the ED today.  She has been using her albuterol and DuoNebs without much improvement in symptoms.  Not currently using a maintenance inhaler, although she has previously used Spiriva and Breztri in the past.  She denies fever, headache, unexpected weight loss, chest pain, palpitations rhinorrhea, sore throat, congestion, ear pain, nausea, vomiting, abdominal pain, dysuria, constipation, diarrhea, or focal weakness.  She admits to smoking about 1 ppd for approximately 50 years, but quit 2 months ago.  Reports occasional marijuana use, but denies any other illicit drug use.  Denies  alcohol use.    ED Course:  Initial vitals significant for /83, , RR 24, SpO2 89% on room air.  Afebrile.  Patient received DuoNebs treatment upon arrival to the ED resulting in improvement in SpO2 to >92%.  Decadron 8 mg administered as well.  CBC significant for leukocytosis (WBC 19.87) with left shift and thrombocytosis (plt 511).  CMP notable for Na 134, Cl 93, and albumin 2.5.  CO2 29 indicating patient more than likely has a chronic respiratory acidosis.  Initial lactic acid 2.6; repeat up-trended to 3.3.  CXR revealed hyperinflated lungs without evidence of focal consolidations or pleural effusions.  CT chest with contrast revealed scattered opacities in bilateral lung fields and an area of consolidation in the LLL favoring an infectious process with underlying emphysema.  Incidental thoracic aortic aneurysms noted as well.  Per sepsis protocol, patient received 1.5 L bolus of NS.  Blood cultures x2 were collected and patient was given Rocephin 1 g IV and Azithromycin 500 mg IV.  Internal Medicine was consulted for further evaluation and management of severe sepsis and CAP.    Review of Systems:  Review of Systems   Constitutional:  Positive for chills and malaise/fatigue. Negative for fever and weight loss.        Decreased appetite   HENT:  Negative for congestion, ear discharge, sinus pain and sore throat.    Eyes:  Negative for discharge and redness.   Respiratory:  Positive for cough, sputum production, shortness of breath and wheezing. Negative for hemoptysis.    Cardiovascular:  Negative for chest pain, palpitations and leg swelling.   Gastrointestinal:  Negative for abdominal pain, constipation, diarrhea, nausea and vomiting.   Genitourinary:  Negative for dysuria, frequency, hematuria and urgency.        Stress urinary incontinence   Musculoskeletal:  Negative for back pain and myalgias.   Skin:  Negative for rash.   Neurological:  Positive for weakness (generalized). Negative for sensory  change and loss of consciousness.   Psychiatric/Behavioral:  The patient is not nervous/anxious and does not have insomnia.        Past Medical History:   Diagnosis Date    COPD (chronic obstructive pulmonary disease)     HTN (hypertension)     Tobacco use      Past Surgical History:   Procedure Laterality Date    APPENDECTOMY       Social History[1]  Family History   Problem Relation Name Age of Onset    Heart failure Mother      Heart failure Father      Kidney disease Father      Heart disease Father      Obesity Sister      Diabetes Sister      Liver cancer Brother       Home Medications:  Current Outpatient Medications   Medication Instructions    albuterol (PROVENTIL) 2.5 mg, Nebulization, Every 4-6 hours PRN    albuterol (PROVENTIL/VENTOLIN HFA) 90 mcg/actuation inhaler albuterol sulfate Take 2 puff(s) (inhalation) every 4-6 hours rinse mouth with water after each use [id number] HFA aerosol inhaler every 4-6 hours  inhalation No set duration recorded No set duration amount recorded active 90 mcg/actuation    albuterol-ipratropium (DUO-NEB) 2.5 mg-0.5 mg/3 mL nebulizer solution 3 mLs, Nebulization, Every 6 hours PRN, Rescue    tiotropium bromide (SPIRIVA RESPIMAT) 1.25 mcg/actuation inhaler 2 puffs, Inhalation, Daily, Controller    tiotropium bromide (SPIRIVA RESPIMAT) 2.5 mcg/actuation inhaler 2 puffs, Inhalation, Daily, Controller      Allergies:  Review of patient's allergies indicates:  No Known Allergies    OBJECTIVE   Vital Signs:  Initial Vitals in ED Most Recent Vitals   Temp: 98.2 °F (36.8 °C)  98.2 °F (36.8 °C)   BP: 137/83  129/78    Pulse: (!) 112  (!) 117   Resp: (!) 24  (!) 22    SpO2: (!) 89 %  (!) 94 %    Body mass index is 18.26 kg/m².    Physical Exam  Constitutional:       General: She is not in acute distress.     Appearance: Normal appearance. She is normal weight.   HENT:      Head: Normocephalic and atraumatic.      Right Ear: Tympanic membrane and ear canal normal.      Left Ear: Tympanic  membrane and ear canal normal.      Nose: Nose normal. No congestion or rhinorrhea.      Mouth/Throat:      Mouth: Mucous membranes are moist.      Pharynx: Oropharynx is clear.   Eyes:      General:         Right eye: No discharge.         Left eye: No discharge.      Extraocular Movements: Extraocular movements intact.      Conjunctiva/sclera: Conjunctivae normal.      Pupils: Pupils are equal, round, and reactive to light.   Cardiovascular:      Rate and Rhythm: Regular rhythm. Tachycardia present.      Pulses: Normal pulses.      Heart sounds: Normal heart sounds. No murmur heard.  Pulmonary:      Effort: Pulmonary effort is normal. Tachypnea (mild) present. No accessory muscle usage, respiratory distress or retractions.      Breath sounds: No stridor. Decreased breath sounds (mildly decreased breath sounds throughout bilateral lung fields) and wheezing (faint expiratory wheezes heard at left lung base) present. No rhonchi or rales.      Comments: Breathing comfortably on room air  Chest:      Chest wall: No tenderness or crepitus.   Abdominal:      General: Abdomen is flat. Bowel sounds are normal. There is no distension.      Palpations: Abdomen is soft.      Tenderness: There is no abdominal tenderness.   Musculoskeletal:         General: No swelling or deformity. Normal range of motion.      Cervical back: Normal range of motion and neck supple. No tenderness.      Right lower leg: No edema.      Left lower leg: No edema.   Lymphadenopathy:      Cervical: No cervical adenopathy.   Skin:     General: Skin is warm and dry.      Capillary Refill: Capillary refill takes less than 2 seconds.   Neurological:      General: No focal deficit present.      Mental Status: She is alert and oriented to person, place, and time.   Psychiatric:         Mood and Affect: Mood normal.         Behavior: Behavior normal. Behavior is cooperative.         Labs:  CBC:  Recent Labs   Lab 06/20/25  1424   WBC 19.87*   HGB 13.8   HCT  "42.2   *   MCV 88.8   RDW 13.9     BMP/CMP:  Recent Labs   Lab 06/20/25  1424   *   K 3.6   CL 93*   CO2 29   BUN 22.9*   CREATININE 0.66   EGFRNORACEVR >60     RFTs/LFTs:  Recent Labs   Lab 06/20/25  1424   CALCIUM 8.4   ALBUMIN 2.5*   AST 16   ALT 19   ALKPHOS 124   BILITOT 0.2     No results for input(s): "MG", "PHOS" in the last 168 hours.  Cardiac Panel:  No results for input(s): "TROPONINI", "CKTOTAL", "CKMB", "BNP" in the last 168 hours.  Lipid Panel:  Lab Results   Component Value Date    CHOL 247 (H) 07/03/2019    HDL 61 (H) 07/03/2019     (H) 07/03/2019    TRIG 118 07/03/2019     Diabetes Panel:  No results found for: "IEOBJAQ9Y", "HGBA1C", "GLUF", "MICALBCREAT", "MICROALBUR", "CREATRANDUR"  Thyroid Panel:  Lab Results   Component Value Date    TSH 5.026 (H) 07/03/2019    MIPBAM3YPNE 0.93 07/03/2019     Anemia Panel:  No results found for: "IRON", "TIBC", "FERRITIN", "HTDCERXJ92", "FOLATE"  Coagulation Panel:  No results for input(s): "PT", "INR", "PTT" in the last 168 hours.  No results for input(s): "DDIMER", "FIBRINOGEN", "ANTIXA" in the last 168 hours.    Invalid input(s): "HEPXA"  Urinalysis:  Lab Results   Component Value Date    APPEARANCEUA Clear 06/20/2025    SGUA 1.045 (H) 06/20/2025    PROTEINUA Negative 06/20/2025    KETONESUA 1+ (A) 06/20/2025    LEUKOCYTESUR Negative 06/20/2025    RBCUA 0-5 06/20/2025    WBCUA 0-5 06/20/2025    BACTERIA None Seen 06/20/2025    SQEPUA Trace (A) 06/20/2025    HYALINECASTS None Seen 06/20/2025      Urine Drug Screening:  No results found for: "AMPHETAMINES", "BARBITURATES", "LABBENZ", "CANNABUR", "COCAINEMETAB", "FENTANYL", "MDMA", "OPIATESCREEN", "PCDSOPHENCYN"  Imaging  CT Chest With Contrast  Narrative: EXAMINATION:  CT CHEST WITH CONTRAST    CLINICAL HISTORY:  Cough, persistent;    TECHNIQUE:  Helical acquisition through the chest performed with IV contrast. Three plane reconstructions made available for review. DLP 63 mGycm. Automatic " exposure control, adjustment of mA/kV or iterative reconstruction technique was used to reduce radiation.    COMPARISON:  No prior chest CT    FINDINGS:  There are no significantly enlarged thoracic lymph nodes.    The heart is not significantly enlarged.  No pericardial effusion.  There are coronary artery calcifications.  There is dilatation of the ascending thoracic aorta up to 4.5 cm.  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  There is also aneurysm of the descending thoracic aorta measuring up to 5.1 cm on image 39 series 2.  Large amount of soft plaque within this aneurysm.    There is no pleural effusion.  There is a subpleural area of consolidation in the left lower lobe on image 45 series 8 measuring up to 2-3 cm.  There are patchy nodular and tree-in-bud opacities elsewhere primarily in the left lung.  There is moderate emphysema.    There are no acute findings in the imaged upper abdomen.  There are no acute osseous findings.  Impression: 1. Scattered opacities in both lungs favored infectious. Recommend a follow-up chest CT in 3 months to monitor.  2. Thoracic aortic aneurysms as discussed.    Electronically signed by: Emerson Anaya  Date:    06/20/2025  Time:    18:12  X-Ray Chest PA And Lateral  Narrative: EXAMINATION:  XR CHEST PA AND LATERAL    CLINICAL HISTORY:  Shortness of breath    TECHNIQUE:  Frontal and lateral radiographs of the chest    COMPARISON:  Radiography 11/19/2024    FINDINGS:  Normal heart size.  Tortuosity of the thoracic aorta similar since November.  The lungs are well-inflated. No consolidation identified. No pleural effusion or discernible pneumothorax.  Impression: No acute pulmonary process appreciated.    Electronically signed by: Humberto Murillo  Date:    06/20/2025  Time:    16:02    EKG  sinus tachycardia, prolonged QT interval 481    Microbiology  Microbiology Results (last 7 days)       Procedure Component Value  Units Date/Time    Gram Stain [[id number]] Collected: 06/20/25 2122    Order Status: Sent Specimen: Sputum, Expectorated Updated: 06/20/25 2122    Respiratory Culture [[id number]] Collected: 06/20/25 2121    Order Status: Sent Specimen: Sputum, Expectorated Updated: 06/20/25 2121    Respiratory Culture [[id number]]     Order Status: Canceled Specimen: Sputum, Expectorated     Respiratory Culture [[id number]]     Order Status: Canceled Specimen: Sputum, Expectorated     Blood Culture [[id number]] Collected: 06/20/25 1524    Order Status: Sent Specimen: Blood from Arm, Right Updated: 06/20/25 1535    Blood Culture [[id number]] Collected: 06/20/25 1524    Order Status: Sent Specimen: Blood from Arm, Left Updated: 06/20/25 1535           Antibiotics  Antibiotics (From admission, onward)      Start     Stop Route Frequency Ordered    06/21/25 0600  doxycycline tablet 100 mg         -- Oral Every 12 hours 06/20/25 2147    06/21/25 0000  cefTRIAXone injection 1 g         06/24/25 2359 IV Every 24 hours (non-standard times) 06/20/25 2006           Pathology  Pathology Results  (Last 365 days)      None               ASSESSMENT AND PLAN     Severe sepsis with lactic acidosis   CAP of the LLL, non-severe   History of COPD  History of tobacco use  - SIRS 3/4 (, RR 24, WBC 19.87)  - Initial lactic acid 2.6, repeat uptrended to 3.3. Trending lactate q6h.   - CXR revealed hyperinflated lungs without evidence of focal consolidations or pleural effusions.    - CT chest with contrast revealed scattered opacities in bilateral lung fields and an area of consolidation in the LLL favoring an infectious process with underlying emphysema.   - Received DuoNeb treatments x2 and Decadron 8 mg in the ED  - Sepsis protocol initiated in the ED. Patient received 30 cc/kg Rocephin 1 g IV and Azithromycin 500 mg IV.  - Blood cultures x 2 collected and pending.   - COVID/FLU/RSV negative.  Respiratory panel respiratory culture, MRSA PCR, and  Gram stain ordered.  - Will continue treating for non-severe CAP with Rocephin 1 g IV daily and Doxycycline 100 mg PO BID.  Azithromycin and Levaquin contraindicated given QTc prolongation.  - Received Decadron 8 mg in the ED. Continue with prednisone 40 mg daily.   - DuoNebs and incentive spirometry q4h while awake.  Mucinex BID.   - Supplemental oxygen as needed to maintain SpO2 92-88%.  Continuous pulse oximetry.  - Consider initiating treatment with maintenance inhaler for COPD     Thoracic aortic aneurysms   Prolonged QTc  History of hypertension  History of hyperlipidemia  - Incidental thoracic aortic aneurysms noted on CT chest.  Ascending thoracic aortic dilation measuring 4.5 cm; saccular aneurysm off of the distal aortic arch measuring about 2 cm; descending thoracic aortic aneurysm measuring 5.1 cm.  No previous CTs on file for comparison.  - Patient will need repeat imaging in approximately 6 months for continued monitoring of thoracic aortic aneurysms.  - Consider obtaining abdominal ultrasound to screen for AAA.   - EKG revealed sinus tachycardia with prolonged QTc 481. Avoid QT prolonging agents.   - /83 upon arrival.  Not currently taking any antihypertensive medications.  - PRN antihypertensives ordered  - Lipid panel and A1c ordered       DVT Prophylaxis: SCDs  GI Prophylaxis: None  Abx: Rocephin +  Doxycycline    Access: pIV  Fluids: LR @ 85 cc/hr  Diet: Diet Adult Regular    Code Status: Full Code    Disposition:  70-year-old female admitted for severe sepsis secondary to non-severe community-acquired pneumonia.  Remains hemodynamically stable and breathing comfortably on room air.  Treating with Rocephin, Doxycycline, and steroids given history of COPD.  Trending lactate q6h. Continue scheduled DuoNebs and incentive spirometry.  Discharge pending resolution of lactic acidosis and clinical improvement.       Please appreciate attending's attestation to follow.    Sera Grant MD  [facility]  Internal Medicine, PGY-1  06/20/2025           [1]   Social History  Tobacco Use    Smoking status: Every Day     Current packs/day: 0.50     Types: Cigarettes     Passive exposure: Current    Smokeless tobacco: Never   Substance Use Topics    Alcohol use: Never    Drug use: Not Currently     Types: Marijuana

## 2025-06-21 NOTE — PLAN OF CARE
Problem: Adult Inpatient Plan of Care  Goal: Plan of Care Review  Outcome: Progressing  Goal: Patient-Specific Goal (Individualized)  Outcome: Progressing  Goal: Absence of Hospital-Acquired Illness or Injury  Outcome: Progressing  Goal: Optimal Comfort and Wellbeing  Outcome: Progressing  Goal: Readiness for Transition of Care  Outcome: Progressing     Problem: Sepsis/Septic Shock  Goal: Optimal Coping  Outcome: Progressing  Goal: Absence of Bleeding  Outcome: Progressing  Goal: Blood Glucose Level Within Targeted Range  Outcome: Progressing  Goal: Absence of Infection Signs and Symptoms  Outcome: Progressing  Goal: Optimal Nutrition Intake  Outcome: Progressing     Problem: Pneumonia  Goal: Fluid Balance  Outcome: Progressing  Goal: Resolution of Infection Signs and Symptoms  Outcome: Progressing  Goal: Effective Oxygenation and Ventilation  Outcome: Progressing     Problem: Skin Injury Risk Increased  Goal: Skin Health and Integrity  Outcome: Progressing     Problem: Infection  Goal: Absence of Infection Signs and Symptoms  Outcome: Progressing

## 2025-06-21 NOTE — PLAN OF CARE
Problem: Adult Inpatient Plan of Care  Goal: Plan of Care Review  Outcome: Ongoing  Goal: Patient-Specific Goal (Individualized)  Outcome: Ongoing  Goal: Absence of Hospital-Acquired Illness or Injury  Outcome: Ongoing  Goal: Optimal Comfort and Wellbeing  Outcome: Ongoing  Goal: Readiness for Transition of Care  Outcome: Ongoing     Problem: Sepsis/Septic Shock  Goal: Optimal Coping  Outcome: Ongoing  Goal: Absence of Bleeding  Outcome: Ongoing  Goal: Blood Glucose Level Within Targeted Range  Outcome: Ongoing  Goal: Absence of Infection Signs and Symptoms  Outcome: Ongoing  Goal: Optimal Nutrition Intake  Outcome: Ongoing     Problem: Pneumonia  Goal: Fluid Balance  Outcome: Ongoing  Goal: Resolution of Infection Signs and Symptoms  Outcome: Ongoing  Goal: Effective Oxygenation and Ventilation  Outcome: Ongoing

## 2025-06-22 LAB
ALBUMIN SERPL-MCNC: 2.4 G/DL (ref 3.4–4.8)
ALBUMIN/GLOB SERPL: 0.6 RATIO (ref 1.1–2)
ALP SERPL-CCNC: 90 UNIT/L (ref 40–150)
ALT SERPL-CCNC: 18 UNIT/L (ref 0–55)
ANION GAP SERPL CALC-SCNC: 9 MEQ/L
AST SERPL-CCNC: 28 UNIT/L (ref 11–45)
BASOPHILS # BLD AUTO: 0.06 X10(3)/MCL
BASOPHILS NFR BLD AUTO: 0.3 %
BILIRUB SERPL-MCNC: 0.2 MG/DL
BUN SERPL-MCNC: 19.4 MG/DL (ref 9.8–20.1)
CALCIUM SERPL-MCNC: 8.2 MG/DL (ref 8.4–10.2)
CHLORIDE SERPL-SCNC: 102 MMOL/L (ref 98–107)
CO2 SERPL-SCNC: 23 MMOL/L (ref 23–31)
CREAT SERPL-MCNC: 0.6 MG/DL (ref 0.55–1.02)
CREAT/UREA NIT SERPL: 32
EOSINOPHIL # BLD AUTO: 0.01 X10(3)/MCL (ref 0–0.9)
EOSINOPHIL NFR BLD AUTO: 0 %
ERYTHROCYTE [DISTWIDTH] IN BLOOD BY AUTOMATED COUNT: 14.5 % (ref 11.5–17)
GFR SERPLBLD CREATININE-BSD FMLA CKD-EPI: >60 ML/MIN/1.73/M2
GLOBULIN SER-MCNC: 3.7 GM/DL (ref 2.4–3.5)
GLUCOSE SERPL-MCNC: 116 MG/DL (ref 82–115)
HCT VFR BLD AUTO: 36.9 % (ref 37–47)
HGB BLD-MCNC: 12.1 G/DL (ref 12–16)
IMM GRANULOCYTES # BLD AUTO: 0.26 X10(3)/MCL (ref 0–0.04)
IMM GRANULOCYTES NFR BLD AUTO: 1.1 %
LYMPHOCYTES # BLD AUTO: 3.28 X10(3)/MCL (ref 0.6–4.6)
LYMPHOCYTES NFR BLD AUTO: 14.3 %
MAGNESIUM SERPL-MCNC: 2 MG/DL (ref 1.6–2.6)
MCH RBC QN AUTO: 29.6 PG (ref 27–31)
MCHC RBC AUTO-ENTMCNC: 32.8 G/DL (ref 33–36)
MCV RBC AUTO: 90.2 FL (ref 80–94)
MONOCYTES # BLD AUTO: 1.89 X10(3)/MCL (ref 0.1–1.3)
MONOCYTES NFR BLD AUTO: 8.2 %
NEUTROPHILS # BLD AUTO: 17.41 X10(3)/MCL (ref 2.1–9.2)
NEUTROPHILS NFR BLD AUTO: 76.1 %
NRBC BLD AUTO-RTO: 0 %
PHOSPHATE SERPL-MCNC: 3 MG/DL (ref 2.3–4.7)
PLATELET # BLD AUTO: 498 X10(3)/MCL (ref 130–400)
PMV BLD AUTO: 9.3 FL (ref 7.4–10.4)
POTASSIUM SERPL-SCNC: 4.1 MMOL/L (ref 3.5–5.1)
PROT SERPL-MCNC: 6.1 GM/DL (ref 5.8–7.6)
RBC # BLD AUTO: 4.09 X10(6)/MCL (ref 4.2–5.4)
SODIUM SERPL-SCNC: 134 MMOL/L (ref 136–145)
WBC # BLD AUTO: 22.91 X10(3)/MCL (ref 4.5–11.5)

## 2025-06-22 PROCEDURE — 27000207 HC ISOLATION

## 2025-06-22 PROCEDURE — 21400001 HC TELEMETRY ROOM

## 2025-06-22 PROCEDURE — 25000003 PHARM REV CODE 250

## 2025-06-22 PROCEDURE — 94640 AIRWAY INHALATION TREATMENT: CPT

## 2025-06-22 PROCEDURE — 94761 N-INVAS EAR/PLS OXIMETRY MLT: CPT

## 2025-06-22 PROCEDURE — 84100 ASSAY OF PHOSPHORUS: CPT

## 2025-06-22 PROCEDURE — 63600175 PHARM REV CODE 636 W HCPCS

## 2025-06-22 PROCEDURE — 83735 ASSAY OF MAGNESIUM: CPT

## 2025-06-22 PROCEDURE — 27000221 HC OXYGEN, UP TO 24 HOURS

## 2025-06-22 PROCEDURE — 80053 COMPREHEN METABOLIC PANEL: CPT

## 2025-06-22 PROCEDURE — 99900035 HC TECH TIME PER 15 MIN (STAT)

## 2025-06-22 PROCEDURE — 36415 COLL VENOUS BLD VENIPUNCTURE: CPT

## 2025-06-22 PROCEDURE — 25000242 PHARM REV CODE 250 ALT 637 W/ HCPCS

## 2025-06-22 PROCEDURE — 85025 COMPLETE CBC W/AUTO DIFF WBC: CPT

## 2025-06-22 RX ADMIN — IBUPROFEN 400 MG: 400 TABLET, FILM COATED ORAL at 08:06

## 2025-06-22 RX ADMIN — PREDNISONE 40 MG: 20 TABLET ORAL at 08:06

## 2025-06-22 RX ADMIN — TIOTROPIUM BROMIDE INHALATION SPRAY 2 PUFF: 3.12 SPRAY, METERED RESPIRATORY (INHALATION) at 07:06

## 2025-06-22 RX ADMIN — SENNOSIDES AND DOCUSATE SODIUM 1 TABLET: 50; 8.6 TABLET ORAL at 08:06

## 2025-06-22 RX ADMIN — DOXYCYCLINE HYCLATE 100 MG: 100 TABLET, COATED ORAL at 08:06

## 2025-06-22 RX ADMIN — IPRATROPIUM BROMIDE AND ALBUTEROL SULFATE 3 ML: .5; 3 SOLUTION RESPIRATORY (INHALATION) at 08:06

## 2025-06-22 RX ADMIN — SODIUM CHLORIDE, POTASSIUM CHLORIDE, SODIUM LACTATE AND CALCIUM CHLORIDE: 600; 310; 30; 20 INJECTION, SOLUTION INTRAVENOUS at 01:06

## 2025-06-22 RX ADMIN — GUAIFENESIN 600 MG: 600 TABLET, EXTENDED RELEASE ORAL at 08:06

## 2025-06-22 RX ADMIN — LABETALOL HYDROCHLORIDE 10 MG: 5 INJECTION, SOLUTION INTRAVENOUS at 06:06

## 2025-06-22 RX ADMIN — IPRATROPIUM BROMIDE AND ALBUTEROL SULFATE 3 ML: .5; 3 SOLUTION RESPIRATORY (INHALATION) at 07:06

## 2025-06-22 NOTE — NURSING
"Pt refuses to ambulate despite multiple attempts. Education given on necessity to assess oxygen saturation while walking. Pt stating "I need to sleep". Pt at rest now room air sats at 89%, /80, RR 18, T 98.2 .  "

## 2025-06-22 NOTE — PROGRESS NOTES
[facility] Medicine Wards Progress Note     Resident Team: [facility] Medicine List 2  Attending Physician: Diana Cortes MD  Resident: Nimisha  Intern: Hannah       Subjective:      Brief HPI:  Kristen Crowley is a 70 y.o. female with a PMHx of COPD, hypertension, and tobacco use who presents to [facility] on 06/20/2025 for shortness of breath, cough productive of thick, green sputum, fatigue, chills, decreased appetite, and generalized weakness x1 week.  Patient reports several members of her family have been diagnosed with flu over the last 1-2 weeks.  She reports that for the last several months she has been experiencing intermittent shortness of breaths and upper respiratory symptoms.  Over the past week, she states that she has been too weak to get out of bed to walk to the kitchen or the bathroom and reports episodes of urinary incontinence with coughing which is what prompted her to present to the ED today.  She has been using her albuterol and DuoNebs without much improvement in symptoms.  Not currently using a maintenance inhaler, although she has previously used Spiriva and Breztri in the past.  She denies fever, headache, unexpected weight loss, chest pain, palpitations rhinorrhea, sore throat, congestion, ear pain, nausea, vomiting, abdominal pain, dysuria, constipation, diarrhea, or focal weakness.  She admits to smoking about 1 ppd for approximately 50 years, but quit 2 months ago.  Reports occasional marijuana use, but denies any other illicit drug use.  Denies alcohol use.     ED Course:  Initial vitals significant for /83, , RR 24, SpO2 89% on room air.  Afebrile.  Patient received DuoNebs treatment upon arrival to the ED resulting in improvement in SpO2 to >92%.  Decadron 8 mg administered as well.  CBC significant for leukocytosis (WBC 19.87) with left shift and thrombocytosis (plt [phone]).  CMP notable for Na 134, Cl 93, and albumin 2.5.  CO2 29 indicating patient more than likely has a chronic  respiratory acidosis.  Initial lactic acid 2.6; repeat up-trended to 3.3.  CXR revealed hyperinflated lungs without evidence of focal consolidations or pleural effusions.  CT chest with contrast revealed scattered opacities in bilateral lung fields and an area of consolidation in the LLL favoring an infectious process with underlying emphysema.  Incidental thoracic aortic aneurysms noted as well.  Per sepsis protocol, patient received 1.5 L bolus of NS.  Blood cultures x2 were collected and patient was given Rocephin 1 g IV and Azithromycin 500 mg IV.  Internal Medicine was consulted for further evaluation and management of severe sepsis and CAP.    Interval History: SOB improved, still has not gotten up to move around. Denies any CP, tolerating PO intake.      Review of Systems:  ROS completed and negative except as indicated above.     Objective:     Last 24 Hour Vital Signs:  BP  Min: 107/65  Max: 154/69  Temp  Av °F (36.7 °C)  Min: 97.7 °F (36.5 °C)  Max: 98.7 °F (37.1 °C)  Pulse  Av.7  Min: 69  Max: 104  Resp  Av.4  Min: 16  Max: 24  SpO2  Av.7 %  Min: 92 %  Max: 95 %  I/O last 3 completed shifts:  In: 800 [P.O.:800]  Out: 950 [Urine:950]    Physical Exam  Vitals reviewed.   Constitutional:       General: She is awake. She is not in acute distress.     Appearance: She is not ill-appearing, toxic-appearing or diaphoretic.   HENT:      Head: Normocephalic.      Nose: No congestion.      Mouth/Throat:      Mouth: Mucous membranes are moist.   Eyes:      Conjunctiva/sclera: Conjunctivae normal.   Cardiovascular:      Rate and Rhythm: Normal rate and regular rhythm.      Heart sounds: Normal heart sounds. Heart sounds not distant. No murmur heard.     No friction rub. No gallop.   Pulmonary:      Effort: Pulmonary effort is normal. No accessory muscle usage, prolonged expiration or respiratory distress.      Breath sounds: Decreased breath sounds (bilateral lung fields) present. No wheezing or  "rhonchi.      Comments: No increased work of breathing, on RA  Abdominal:      General: Abdomen is flat. Bowel sounds are normal. There is no distension.      Palpations: Abdomen is soft.      Tenderness: There is no abdominal tenderness.   Musculoskeletal:         General: No swelling or tenderness.   Skin:     General: Skin is warm and dry.      Capillary Refill: Capillary refill takes less than 2 seconds.   Neurological:      General: No focal deficit present.      Mental Status: She is alert.   Psychiatric:         Mood and Affect: Mood normal.         Behavior: Behavior is cooperative.       Laboratory:  Most Recent Data:  CBC:   Lab Results   Component Value Date    WBC 22.91 (H) 06/22/2025    HGB 12.1 06/22/2025    HCT 36.9 (L) 06/22/2025     (H) 06/22/2025    MCV 90.2 06/22/2025    RDW 14.5 06/22/2025     WBC Differential:   Recent Labs   Lab 06/20/25  1424 06/21/25  0507 06/22/25  0328   WBC 19.87* 19.83* 22.91*   HGB 13.8 11.9* 12.1   HCT 42.2 36.0* 36.9*   * 460* 498*   MCV 88.8 89.6 90.2     BMP:   Lab Results   Component Value Date     (L) 06/22/2025    K 4.1 06/22/2025     06/22/2025    CO2 23 06/22/2025    BUN 19.4 06/22/2025    CREATININE 0.60 06/22/2025     (H) 06/22/2025    CALCIUM 8.2 (L) 06/22/2025    MG 2.00 06/22/2025    PHOS 3.0 06/22/2025     LFTs:   Lab Results   Component Value Date    PROT 6.1 06/22/2025    ALBUMIN 2.4 (L) 06/22/2025    BILITOT 0.2 06/22/2025    AST 28 06/22/2025    ALKPHOS 90 06/22/2025    ALT 18 06/22/2025     Coags: No results found for: "INR", "PROTIME", "PTT"  FLP:   Lab Results   Component Value Date    CHOL 162 06/20/2025    HDL 41 06/20/2025    TRIG 115 06/20/2025     DM:   Lab Results   Component Value Date    HGBA1C 5.9 06/21/2025    CREATININE 0.60 06/22/2025     Thyroid:   Lab Results   Component Value Date    TSH 1.421 06/20/2025      Anemia: No results found for: "IRON", "TIBC", "FERRITIN", "SATURATEDIRO"  No results found " "for: "[id number]"  No results found for: "FOLATE"     Cardiac: No results found for: "TROPONINI", "CKTOTAL", "CKMB", "BNP"      Microbiology Data:  Microbiology Results (last 7 days)       Procedure Component Value Units Date/Time    Respiratory Culture [[id number]] Collected: 06/20/25 2121    Order Status: Completed Specimen: Sputum, Expectorated Updated: 06/22/25 0653     Respiratory Culture Normal respiratory davis     GRAM STAIN Quality 3+      Few Gram positive cocci      Rare Gram Negative Rods      Rare Gram Positive Rods    Blood Culture [[id number]]  (Normal) Collected: 06/20/25 1524    Order Status: Completed Specimen: Blood from Arm, Left Updated: 06/22/25 0002     Blood Culture No Growth At 24 Hours    Blood Culture [[id number]]  (Normal) Collected: 06/20/25 1524    Order Status: Completed Specimen: Blood from Arm, Right Updated: 06/22/25 0002     Blood Culture No Growth At 24 Hours    Gram Stain [[id number]] Collected: 06/20/25 2122    Order Status: Sent Specimen: Sputum, Expectorated Updated: 06/20/25 2122    Respiratory Culture [[id number]]     Order Status: Canceled Specimen: Sputum, Expectorated     Respiratory Culture [[id number]]     Order Status: Canceled Specimen: Sputum, Expectorated              Other Results:    Radiology:  Imaging Results              CT Chest With Contrast (Final result)  Result time 06/20/25 18:12:32      Final result by Emerson Anaya MD (06/20/25 18:12:32)                   Impression:      1. Scattered opacities in both lungs favored infectious. Recommend a follow-up chest CT in 3 months to monitor.  2. Thoracic aortic aneurysms as discussed.      Electronically signed by: Emerson Anaya  Date:    06/20/2025  Time:    18:12               Narrative:    EXAMINATION:  CT CHEST WITH CONTRAST    CLINICAL HISTORY:  Cough, persistent;    TECHNIQUE:  Helical acquisition through the chest performed with IV contrast. Three plane reconstructions made available for review. DLP 63 " mGycm. Automatic exposure control, adjustment of mA/kV or iterative reconstruction technique was used to reduce radiation.    COMPARISON:  No prior chest CT    FINDINGS:  There are no significantly enlarged thoracic lymph nodes.    The heart is not significantly enlarged.  No pericardial effusion.  There are coronary artery calcifications.  There is dilatation of the ascending thoracic aorta up to 4.5 cm.  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  There is also aneurysm of the descending thoracic aorta measuring up to 5.1 cm on image 39 series 2.  Large amount of soft plaque within this aneurysm.    There is no pleural effusion.  There is a subpleural area of consolidation in the left lower lobe on image 45 series 8 measuring up to 2-3 cm.  There are patchy nodular and tree-in-bud opacities elsewhere primarily in the left lung.  There is moderate emphysema.    There are no acute findings in the imaged upper abdomen.  There are no acute osseous findings.                                       X-Ray Chest PA And Lateral (Final result)  Result time 06/20/25 16:02:11      Final result by Humberto Murillo MD (06/20/25 16:02:11)                   Impression:      No acute pulmonary process appreciated.      Electronically signed by: Humberto Murillo  Date:    06/20/2025  Time:    16:02               Narrative:    EXAMINATION:  XR CHEST PA AND LATERAL    CLINICAL HISTORY:  Shortness of breath    TECHNIQUE:  Frontal and lateral radiographs of the chest    COMPARISON:  Radiography 11/19/2024    FINDINGS:  Normal heart size.  Tortuosity of the thoracic aorta similar since November.  The lungs are well-inflated. No consolidation identified. No pleural effusion or discernible pneumothorax.                                      Current Medications:     Infusions:   lactated ringers   Intravenous Continuous 85 mL/hr at 06/21/25 2109 New Bag at 06/21/25 2109         Scheduled:   albuterol-ipratropium  3 mL Nebulization Q6H    doxycycline  100 mg Oral Q12H    guaiFENesin  600 mg Oral BID    polyethylene glycol  17 g Oral Daily    predniSONE  40 mg Oral Daily    senna-docusate  1 tablet Oral BID    tiotropium bromide  2 puff Inhalation Daily        PRN:    Current Facility-Administered Medications:     acetaminophen, 650 mg, Oral, Q8H PRN    hydrALAZINE, 10 mg, Intravenous, Q6H PRN    ibuprofen, 400 mg, Oral, Q6H PRN    labetalol, 10 mg, Intravenous, Q6H PRN    melatonin, 6 mg, Oral, Nightly PRN    ondansetron, 4 mg, Intravenous, Q8H PRN    sodium chloride 0.9%, 10 mL, Intravenous, PRN    sodium chloride 0.9%, 10 mL, Intravenous, PRN    sodium chloride 0.9%, 10 mL, Intravenous, PRN    Assessment & Plan:     Severe sepsis with lactic acidosis - resolved  CAP of the LLL, non-severe   History of COPD  History of tobacco use  - SIRS 3/4 (, RR 24, WBC 19.87) on presentation   - CT chest with contrast revealed scattered opacities in bilateral lung fields and an area of consolidation in the LLL favoring an infectious process with underlying emphysema.  - COVID/FLU/RSV negative.  Respiratory panel positive for Rhinovirus. MRSA PCR negative.   - Received DuoNeb treatments x2 and Decadron 8 mg in the ED. Also received 30 cc/kg fluid resuscitation, Rocephin 1 g IV and Azithromycin 500 mg IV.  - Blood cultures x 2, Respiratory culture and Gram stain collected and pending.  - CAP likely viral in nature with super imposed COPD exacerbation. Continue atypical organisms/ COPD coverage with Doxycycline 100 mg PO BID.  Azithromycin and Levaquin contraindicated given QTc prolongation.  - Initial lactic acid 2.6, repeat uptrended to 3.3. Following IVF resuscitation improved to 1.8.   - Continue with prednisone 40 mg daily. Day 2  - DuoNebs, incentive spirometry, CPT q6h.  Mucinex BID.   - Supplemental oxygen as needed to maintain SpO2 88-92%.  Continuous pulse oximetry.  - 6 min walk test to  evaluate for oxygenation with exertion     Thoracic aortic aneurysms   Prolonged QTc  History of hypertension  History of hyperlipidemia  - Incidental thoracic aortic aneurysms noted on CT chest.  Ascending thoracic aortic dilation measuring 4.5 cm; saccular aneurysm off of the distal aortic arch measuring about 2 cm; descending thoracic aortic aneurysm measuring 5.1 cm.  No previous CTs on file for comparison.  - Patient will need repeat imaging in approximately 6 months for continued monitoring of thoracic aortic aneurysms.  - Abdominal ultrasound to screen for AAA on Monday or schedule OP  - EKG revealed sinus tachycardia with prolonged QTc 481. Avoid QT prolonging agents.   - /83 upon arrival.  Not currently taking any antihypertensive medications.  - PRN antihypertensives ordered  - Lipid panel wnl and A1c 5.9         CODE STATUS: Full  Access: pIV  Antibiotics: Rocephin/Doxycycline  Diet: Regular  DVT Prophylaxis: SCDs  GI Prophylaxis: --  Fluids: LR 85 cc/hr      Disposition: 70-year-old female admitted for severe sepsis secondary to non-severe community-acquired pneumonia.  Remains hemodynamically stable and breathing comfortably on room air.  Treating with Doxycycline, and steroids given history of COPD.  Lactate normalized. Continue scheduled DuoNebs and incentive spirometry.  Discharge pending clinical improvement.       Lamont Bansal MD  [facility]

## 2025-06-22 NOTE — PLAN OF CARE
Problem: Adult Inpatient Plan of Care  Goal: Plan of Care Review  Outcome: Progressing  Goal: Patient-Specific Goal (Individualized)  Outcome: Progressing  Goal: Absence of Hospital-Acquired Illness or Injury  Outcome: Progressing  Goal: Optimal Comfort and Wellbeing  Outcome: Progressing  Goal: Readiness for Transition of Care  Outcome: Progressing     Problem: Sepsis/Septic Shock  Goal: Absence of Bleeding  Outcome: Progressing  Goal: Blood Glucose Level Within Targeted Range  Outcome: Progressing  Goal: Absence of Infection Signs and Symptoms  Outcome: Progressing  Goal: Optimal Nutrition Intake  Outcome: Progressing     Problem: Pneumonia  Goal: Fluid Balance  Outcome: Progressing  Goal: Resolution of Infection Signs and Symptoms  Outcome: Progressing  Goal: Effective Oxygenation and Ventilation  Outcome: Progressing     Problem: Skin Injury Risk Increased  Goal: Skin Health and Integrity  Outcome: Progressing     Problem: Infection  Goal: Absence of Infection Signs and Symptoms  Outcome: Progressing

## 2025-06-23 PROBLEM — E44.0 MODERATE MALNUTRITION: Status: ACTIVE | Noted: 2025-06-23

## 2025-06-23 PROBLEM — I71.23 ANEURYSM OF DESCENDING THORACIC AORTA WITHOUT RUPTURE: Status: ACTIVE | Noted: 2025-06-23

## 2025-06-23 PROBLEM — R06.02 SOB (SHORTNESS OF BREATH): Status: ACTIVE | Noted: 2025-06-23

## 2025-06-23 PROBLEM — J44.0 COPD WITH PNEUMONIA: Status: ACTIVE | Noted: 2023-02-14

## 2025-06-23 PROBLEM — J18.9 COPD WITH PNEUMONIA: Status: ACTIVE | Noted: 2023-02-14

## 2025-06-23 LAB
ALBUMIN SERPL-MCNC: 2.2 G/DL (ref 3.4–4.8)
ALBUMIN/GLOB SERPL: 0.7 RATIO (ref 1.1–2)
ALP SERPL-CCNC: 78 UNIT/L (ref 40–150)
ALT SERPL-CCNC: 16 UNIT/L (ref 0–55)
ANION GAP SERPL CALC-SCNC: 8 MEQ/L
APICAL FOUR CHAMBER EJECTION FRACTION: 41 %
APICAL TWO CHAMBER EJECTION FRACTION: 40 %
AST SERPL-CCNC: 19 UNIT/L (ref 11–45)
AV INDEX (PROSTH): 0.58
AV MEAN GRADIENT: 6 MMHG
AV PEAK GRADIENT: 13 MMHG
AV VALVE AREA BY VELOCITY RATIO: 1.9 CM²
AV VALVE AREA: 1.8 CM²
AV VELOCITY RATIO: 0.61
BACTERIA SPEC CULT: NORMAL
BASOPHILS # BLD AUTO: 0.03 X10(3)/MCL
BASOPHILS NFR BLD AUTO: 0.2 %
BILIRUB SERPL-MCNC: 0.2 MG/DL
BSA FOR ECHO PROCEDURE: 1.34 M2
BUN SERPL-MCNC: 17.6 MG/DL (ref 9.8–20.1)
CALCIUM SERPL-MCNC: 7.9 MG/DL (ref 8.4–10.2)
CHLORIDE SERPL-SCNC: 103 MMOL/L (ref 98–107)
CO2 SERPL-SCNC: 24 MMOL/L (ref 23–31)
CREAT SERPL-MCNC: 0.61 MG/DL (ref 0.55–1.02)
CREAT/UREA NIT SERPL: 29
CV ECHO LV RWT: 0.57 CM
DOP CALC AO PEAK VEL: 1.8 M/S
DOP CALC AO VTI: 27.9 CM
DOP CALC LVOT AREA: 3.1 CM2
DOP CALC LVOT DIAMETER: 2 CM
DOP CALC LVOT PEAK VEL: 1.1 M/S
DOP CALC LVOT STROKE VOLUME: 50.9 CM3
DOP CALC MV VTI: 22.4 CM
DOP CALCLVOT PEAK VEL VTI: 16.2 CM
E WAVE DECELERATION TIME: 143 MSEC
E/A RATIO: 0.7
E/E' RATIO: 13 M/S
ECHO LV POSTERIOR WALL: 1 CM (ref 0.6–1.1)
EOSINOPHIL # BLD AUTO: 0.02 X10(3)/MCL (ref 0–0.9)
EOSINOPHIL NFR BLD AUTO: 0.1 %
ERYTHROCYTE [DISTWIDTH] IN BLOOD BY AUTOMATED COUNT: 14.5 % (ref 11.5–17)
GFR SERPLBLD CREATININE-BSD FMLA CKD-EPI: >60 ML/MIN/1.73/M2
GLOBULIN SER-MCNC: 3.2 GM/DL (ref 2.4–3.5)
GLUCOSE SERPL-MCNC: 105 MG/DL (ref 82–115)
GRAM STN SPEC: NORMAL
HCT VFR BLD AUTO: 36.2 % (ref 37–47)
HGB BLD-MCNC: 11.8 G/DL (ref 12–16)
IMM GRANULOCYTES # BLD AUTO: 0.24 X10(3)/MCL (ref 0–0.04)
IMM GRANULOCYTES NFR BLD AUTO: 1.5 %
INTERVENTRICULAR SEPTUM: 1.1 CM (ref 0.6–1.1)
LEFT ATRIUM SIZE: 2.9 CM
LEFT VENTRICLE DIASTOLIC VOLUME INDEX: 36.3 ML/M2
LEFT VENTRICLE DIASTOLIC VOLUME: 49 ML
LEFT VENTRICLE END DIASTOLIC VOLUME APICAL 2 CHAMBER: 75 ML
LEFT VENTRICLE END DIASTOLIC VOLUME APICAL 4 CHAMBER: 88.57 ML
LEFT VENTRICLE MASS INDEX: 82.3 G/M2
LEFT VENTRICLE SYSTOLIC VOLUME INDEX: 36.3 ML/M2
LEFT VENTRICLE SYSTOLIC VOLUME: 49 ML
LEFT VENTRICULAR INTERNAL DIMENSION IN DIASTOLE: 3.5 CM (ref 3.5–6)
LEFT VENTRICULAR MASS: 111 G
LV LATERAL E/E' RATIO: 12.5 M/S
LV SEPTAL E/E' RATIO: 12.5 M/S
LVED V (TEICH): 49.02 ML
LVOT MG: 2.28 MMHG
LVOT MV: 0.67 CM/S
LYMPHOCYTES # BLD AUTO: 2.86 X10(3)/MCL (ref 0.6–4.6)
LYMPHOCYTES NFR BLD AUTO: 17.5 %
MAGNESIUM SERPL-MCNC: 1.8 MG/DL (ref 1.6–2.6)
MCH RBC QN AUTO: 29.6 PG (ref 27–31)
MCHC RBC AUTO-ENTMCNC: 32.6 G/DL (ref 33–36)
MCV RBC AUTO: 91 FL (ref 80–94)
MONOCYTES # BLD AUTO: 1.38 X10(3)/MCL (ref 0.1–1.3)
MONOCYTES NFR BLD AUTO: 8.4 %
MV MEAN GRADIENT: 2 MMHG
MV PEAK A VEL: 0.71 M/S
MV PEAK E VEL: 0.5 M/S
MV PEAK GRADIENT: 4 MMHG
MV STENOSIS PRESSURE HALF TIME: 41.51 MS
MV VALVE AREA BY CONTINUITY EQUATION: 2.27 CM2
MV VALVE AREA P 1/2 METHOD: 5.3 CM2
NEUTROPHILS # BLD AUTO: 11.81 X10(3)/MCL (ref 2.1–9.2)
NEUTROPHILS NFR BLD AUTO: 72.3 %
NRBC BLD AUTO-RTO: 0 %
OHS CV RV/LV RATIO: 0.71 CM
OHS LV EJECTION FRACTION SIMPSONS BIPLANE MOD: 40 %
PHOSPHATE SERPL-MCNC: 2.6 MG/DL (ref 2.3–4.7)
PLATELET # BLD AUTO: 474 X10(3)/MCL (ref 130–400)
PMV BLD AUTO: 9 FL (ref 7.4–10.4)
POTASSIUM SERPL-SCNC: 3.8 MMOL/L (ref 3.5–5.1)
PROT SERPL-MCNC: 5.4 GM/DL (ref 5.8–7.6)
RA PRESSURE ESTIMATED: 3 MMHG
RBC # BLD AUTO: 3.98 X10(6)/MCL (ref 4.2–5.4)
RIGHT VENTRICLE DIASTOLIC BASEL DIMENSION: 2.5 CM
RIGHT VENTRICULAR END-DIASTOLIC DIMENSION: 2.51 CM
SODIUM SERPL-SCNC: 135 MMOL/L (ref 136–145)
TDI LATERAL: 0.04 M/S
TDI SEPTAL: 0.04 M/S
TDI: 0.04 M/S
TRICUSPID ANNULAR PLANE SYSTOLIC EXCURSION: 2.1 CM
WBC # BLD AUTO: 16.34 X10(3)/MCL (ref 4.5–11.5)
Z-SCORE OF LEFT VENTRICULAR DIMENSION IN END DIASTOLE: -2.08

## 2025-06-23 PROCEDURE — 83735 ASSAY OF MAGNESIUM: CPT

## 2025-06-23 PROCEDURE — 94761 N-INVAS EAR/PLS OXIMETRY MLT: CPT

## 2025-06-23 PROCEDURE — 63600175 PHARM REV CODE 636 W HCPCS

## 2025-06-23 PROCEDURE — 94640 AIRWAY INHALATION TREATMENT: CPT

## 2025-06-23 PROCEDURE — 21400001 HC TELEMETRY ROOM

## 2025-06-23 PROCEDURE — 27000207 HC ISOLATION

## 2025-06-23 PROCEDURE — 80053 COMPREHEN METABOLIC PANEL: CPT

## 2025-06-23 PROCEDURE — 97161 PT EVAL LOW COMPLEX 20 MIN: CPT

## 2025-06-23 PROCEDURE — 25000003 PHARM REV CODE 250

## 2025-06-23 PROCEDURE — 36415 COLL VENOUS BLD VENIPUNCTURE: CPT

## 2025-06-23 PROCEDURE — 99900035 HC TECH TIME PER 15 MIN (STAT)

## 2025-06-23 PROCEDURE — 27000221 HC OXYGEN, UP TO 24 HOURS

## 2025-06-23 PROCEDURE — 25000242 PHARM REV CODE 250 ALT 637 W/ HCPCS

## 2025-06-23 PROCEDURE — 84100 ASSAY OF PHOSPHORUS: CPT

## 2025-06-23 PROCEDURE — 85025 COMPLETE CBC W/AUTO DIFF WBC: CPT

## 2025-06-23 RX ORDER — CEFTRIAXONE 1 G/1
1 INJECTION, POWDER, FOR SOLUTION INTRAMUSCULAR; INTRAVENOUS
Status: DISCONTINUED | OUTPATIENT
Start: 2025-06-23 | End: 2025-06-27 | Stop reason: HOSPADM

## 2025-06-23 RX ADMIN — IPRATROPIUM BROMIDE AND ALBUTEROL SULFATE 3 ML: .5; 3 SOLUTION RESPIRATORY (INHALATION) at 10:06

## 2025-06-23 RX ADMIN — GUAIFENESIN 600 MG: 600 TABLET, EXTENDED RELEASE ORAL at 08:06

## 2025-06-23 RX ADMIN — DOXYCYCLINE HYCLATE 100 MG: 100 TABLET, COATED ORAL at 10:06

## 2025-06-23 RX ADMIN — SODIUM CHLORIDE, POTASSIUM CHLORIDE, SODIUM LACTATE AND CALCIUM CHLORIDE: 600; 310; 30; 20 INJECTION, SOLUTION INTRAVENOUS at 11:06

## 2025-06-23 RX ADMIN — DOXYCYCLINE HYCLATE 100 MG: 100 TABLET, COATED ORAL at 08:06

## 2025-06-23 RX ADMIN — CEFTRIAXONE SODIUM 1 G: 1 INJECTION, POWDER, FOR SOLUTION INTRAMUSCULAR; INTRAVENOUS at 11:06

## 2025-06-23 RX ADMIN — GUAIFENESIN 600 MG: 600 TABLET, EXTENDED RELEASE ORAL at 10:06

## 2025-06-23 RX ADMIN — IPRATROPIUM BROMIDE AND ALBUTEROL SULFATE 3 ML: .5; 3 SOLUTION RESPIRATORY (INHALATION) at 12:06

## 2025-06-23 RX ADMIN — PREDNISONE 40 MG: 20 TABLET ORAL at 10:06

## 2025-06-23 RX ADMIN — IPRATROPIUM BROMIDE AND ALBUTEROL SULFATE 3 ML: .5; 3 SOLUTION RESPIRATORY (INHALATION) at 07:06

## 2025-06-23 RX ADMIN — TIOTROPIUM BROMIDE INHALATION SPRAY 2 PUFF: 3.12 SPRAY, METERED RESPIRATORY (INHALATION) at 07:06

## 2025-06-23 RX ADMIN — POLYETHYLENE GLYCOL 3350 17 G: 17 POWDER, FOR SOLUTION ORAL at 10:06

## 2025-06-23 RX ADMIN — SENNOSIDES AND DOCUSATE SODIUM 1 TABLET: 50; 8.6 TABLET ORAL at 10:06

## 2025-06-23 RX ADMIN — SODIUM CHLORIDE, POTASSIUM CHLORIDE, SODIUM LACTATE AND CALCIUM CHLORIDE: 600; 310; 30; 20 INJECTION, SOLUTION INTRAVENOUS at 12:06

## 2025-06-23 RX ADMIN — METHYLPREDNISOLONE SODIUM SUCCINATE 80 MG: 40 INJECTION, POWDER, FOR SOLUTION INTRAMUSCULAR; INTRAVENOUS at 05:06

## 2025-06-23 NOTE — PLAN OF CARE
06/23/25 0957   Discharge Assessment   Assessment Type Discharge Planning Assessment   Confirmed/corrected address, phone number and insurance Yes   Confirmed Demographics Correct on Facesheet   Source of Information patient   People in Home grandchild(fermin);child(fermin), adult;other relative(s)   Name(s) of People in Home Kristi Tamayo ([phone]); Jim SAHAin; [employer]   Facility Arrived From: Home   Do you expect to return to your current living situation? Yes   Do you have help at home or someone to help you manage your care at home? Yes   Who are your caregiver(s) and their phone number(s)? Kristi Tamayo Binjennie ([phone]) & Jared SAHA   Prior to hospitilization cognitive status: Alert/Oriented   Walking or Climbing Stairs Difficulty no   Dressing/Bathing Difficulty no   Home Layout Able to live on 1st floor   Equipment Currently Used at Home none  (Father's POC available)   Readmission within 30 days? No   Patient currently being followed by outpatient case management? No   Do you currently have service(s) that help you manage your care at home? No   Do you take prescription medications? Yes   Do you have prescription coverage? Yes   Do you have any problems affording any of your prescribed medications? No   Is the patient taking medications as prescribed? yes   Who is going to help you get home at discharge? Family   How do you get to doctors appointments? family or friend will provide   Discharge Plan A Home with family   DME Needed Upon Discharge  none   Discharge Plan discussed with: Patient   Transition of Care Barriers None     Pt  with 5 children; Resides with Kristi Tamayo SIL, Dustin & grandchildren; Independent with ADL's; Receives SS & Insurance allowance; CM to follow for d/c planning needs.

## 2025-06-23 NOTE — PROGRESS NOTES
Inpatient Nutrition Assessment    Admit Date: 6/20/2025   Total duration of encounter: 3 days   Patient Age: 70 y.o.    Nutrition Recommendation/Prescription     Continue regular diet; pt likes fruits with meals  Will order chocolate boost tid; Boost (provides 240 kcal, 10 g protein per serving)   MVI/fe  Biweekly wt    Communication of Recommendations: reviewed with nurse and reviewed with patient    Nutrition Assessment     Malnutrition Assessment/Nutrition-Focused Physical Exam    Malnutrition Context: acute illness or injury (06/23/25 1028)  Malnutrition Level: moderate (06/23/25 1028)  Energy Intake (Malnutrition): less than or equal to 50% for greater than or equal to 5 days (06/23/25 1028)  Weight Loss (Malnutrition): other (see comments) (Does not meet criteria) (06/23/25 1028)        Muscle Mass (Malnutrition): mild depletion (06/23/25 1028)     Clavicle Bone Region (Muscle Loss): mild depletion        Fluid Accumulation (Malnutrition): other (see comments) (Not present) (06/23/25 1028)     Hand  Strength, Right (Malnutrition): Unable to assess (06/23/25 1028)  A minimum of two characteristics is recommended for diagnosis of either severe or non-severe malnutrition.    Chart Review    Reason Seen: continuous nutrition monitoring    Malnutrition Screening Tool Results   Have you recently lost weight without trying?: No  Have you been eating poorly because of a decreased appetite?: No   MST Score: 0   Diagnosis:  Severe sepsis, CAD, hx COPD/tobacco use, thorasic aortic aneurysm, prolonged QTC, HTN, HLD     Relevant Medical History: COPD, HTN, Tobacco use     Scheduled Medications:  albuterol-ipratropium, 3 mL, Q6H  cefTRIAXone (Rocephin) IV (PEDS and ADULTS), 1 g, Q24H  doxycycline, 100 mg, Q12H  guaiFENesin, 600 mg, BID  methylPREDNISolone injection (PEDS and ADULTS), 80 mg, Q12H  polyethylene glycol, 17 g, Daily  senna-docusate, 1 tablet, BID  tiotropium bromide, 2 puff, Daily    Continuous  Infusions:  lactated ringers, Last Rate: 85 mL/hr at 06/23/25 0639    PRN Medications:  acetaminophen, 650 mg, Q8H PRN  hydrALAZINE, 10 mg, Q6H PRN  ibuprofen, 400 mg, Q6H PRN  labetalol, 10 mg, Q6H PRN  melatonin, 6 mg, Nightly PRN  ondansetron, 4 mg, Q8H PRN  sodium chloride 0.9%, 10 mL, PRN  sodium chloride 0.9%, 10 mL, PRN  sodium chloride 0.9%, 10 mL, PRN    Calorie Containing IV Medications: no significant kcals from medications at this time    Recent Labs   Lab 06/20/25  1424 06/20/25  2351 06/21/25  0507 06/22/25  0328 06/23/25  0340   *  --  138 134* 135*   K 3.6  --  4.0 4.1 3.8   CALCIUM 8.4  --  7.9* 8.2* 7.9*   PHOS  --   --  2.3 3.0 2.6   MG  --   --  2.10 2.00 1.80   CL 93*  --  102 102 103   CO2 29  --  27 23 24   BUN 22.9*  --  19.6 19.4 17.6   CREATININE 0.66  --  0.62 0.60 0.61   EGFRNORACEVR >60  --  >60 >60 >60   *  --  113 116* 105   BILITOT 0.2  --  0.2 0.2 0.2   ALKPHOS 124  --  96 90 78   ALT 19  --  17 18 16   AST 16  --  17 28 19   ALBUMIN 2.5*  --  2.2* 2.4* 2.2*   TRIG  --  115  --   --   --    HGBA1C  --   --  5.9  --   --    WBC 19.87*  --  19.83* 22.91* 16.34*   HGB 13.8  --  11.9* 12.1 11.8*   HCT 42.2  --  36.0* 36.9* 36.2*     Nutrition Orders:  Diet Adult Regular      Appetite/Oral Intake: poor/25-50% of meals  Factors Affecting Nutritional Intake: decreased appetite and shortness of breath  Social Needs Impacting Access to Food: none identified  Food/Taoist/Cultural Preferences: likes fruit   Food Allergies: none reported  Last Bowel Movement: 06/21/25  Wound(s):  none    Comments    (6/23) Pt reported decreased po intake/appetite x 3 weeks 2 + SOB; bed wt during rounds 108#; ? Accuracy of bed scale; pt reported she has weighed 97# in ER; admit wt listed 93#; UBW 98#; will track wts. Pt with mild muscle wasting. Will order ONS for added protein. Labs acknowledged.     Anthropometrics    Height: 5' (152.4 cm), Height Method: Stated  Last Weight: 42.4 kg (93 lb 7.6  oz) (25), Weight Method: Standard Scale  BMI (Calculated): 18.3  BMI Classification: underweight (BMI less than 18.5)     Ideal Body Weight (IBW), Female: 100 lb     % Ideal Body Weight, Female (lb): 93.48 %                    Usual Body Weight (UBW), k.5 kg  % Usual Body Weight: 95.48     Usual Weight Provided By: patient and EMR weight history    Wt Readings from Last 5 Encounters:   25 42.4 kg (93 lb 7.6 oz)   24 44.5 kg (98 lb)   23 47.6 kg (105 lb)   23 46.3 kg (102 lb)   23 45.9 kg (101 lb 3.2 oz)     Weight Change(s) Since Admission: UBW 98#; tracking wts  Wt Readings from Last 1 Encounters:   25 42.4 kg (93 lb 7.6 oz)   25 1351 45.4 kg (100 lb)   Admit Weight: 45.4 kg (100 lb) (25 1351), Weight Method: Stated    Estimated Needs    Weight Used For Calorie Calculations: 42.4 kg (93 lb 7.6 oz)  Energy Calorie Requirements (kcal): 1484 kcal/d; 35 kcal/kg  Energy Need Method: Kcal/kg  Weight Used For Protein Calculations: 42.4 kg (93 lb 7.6 oz)  Protein Requirements: 64 gm protein/d; 1.5 gm/kg  Fluid Requirements (mL): 1484 ml/d; 1ml/clari        Enteral Nutrition     Patient not receiving enteral nutrition at this time.    Parenteral Nutrition     Patient not receiving parenteral nutrition support at this time.    Evaluation of Received Nutrient Intake    Calories: not meeting estimated needs  Protein: not meeting estimated needs    Patient Education     Not applicable.    Nutrition Diagnosis     PES: Inadequate oral intake related to acute illness as evidenced by eatng 50% or less x 3 weeks . (new)     PES: Moderate acute disease or injury related malnutrition Related to spesis As Evidenced by:  - energy intake: <= 50% for 3 weeks (meets criteria for <= 50% >= 5 days (severe - acute)) - muscle mass depletion: 2 areas of mild muscle loss (Pectoralis, Clavicle) new    Nutrition Interventions     Intervention(s): general/healthful diet, commercial  food, multivitamin/mineral supplement therapy, and collaboration with other providers  Intervention(s): Oral nutritional supplement;Oral diet/nutrient modifications;Care coordination or referral    Goal: Meet greater than 80% of nutritional needs by follow-up. (new)  Goal: Maintain weight throughout hospitalization. (new)    Nutrition Goals & Monitoring     Dietitian will monitor: food and beverage intake and weight  Discharge planning: continue regular  diet with boost  oral supplements  Nutrition Risk/Follow-Up: patient at increased nutrition risk; dietitian will follow-up twice weekly   Please consult if re-assessment needed sooner.

## 2025-06-23 NOTE — PT/OT/SLP EVAL
In Ira's bin for completion/signature with med list attached, thanks!   Physical Therapy Evaluation    Patient Name:  Kristen Crowley   MRN:  [id number]    Recommendations:     Therapy Intensity Recommendations at Discharge: Moderate Intensity Therapy  Discharge Equipment Recommendations:  (TBD based on progress)   Equipment to be obtained for discharge: TBD pending progress.  Barriers to discharge: decreased endurance    Assessment:     Kristen Crowley is a 70 y.o. female admitted with a medical diagnosis of Pneumonia of right lower lobe due to infectious organism.  1. SOB (shortness of breath)    2. COPD with pneumonia    3. Aneurysm of descending thoracic aorta without rupture    4. Routine check-up    5. Pneumonia of right lower lobe due to infectious organism       Problem List[1]   She presents with the following impairments/functional limitations:  impaired endurance, impaired self care skills, impaired functional mobility, gait instability, impaired balance, decreased lower extremity function, impaired cardiopulmonary response to activity.    Rehab Prognosis: TBD pending progress.    Patient would benefit from continued skilled acute PT services to: address above listed impairments/functional limitations; receive patient/caregiver education; reduce fall risk; and maximize independency/safety with functional mobility.    Recent Surgery: * No surgery found *      Plan:     During this hospitalization, patient to be seen 5 x/week to address the identified impairments/functional limitations via gait training, therapeutic activities, therapeutic exercises, neuromuscular re-education and progress toward the established goals.    Plan of Care Expires:  07/21/25    Subjective     Communicated with patient's nurse Kathia prior to session.    Patient agreeable to participate in evaluation.     Chief Complaint: Shortness of breath with standing, side steps   Patient/Family Comments/goals: None stated   Pain/Comfort:  Pain Rating 1: 0/10  Pain Rating Post-Intervention 1: 0/10    Patients  cultural, spiritual, Orthodox conflicts given the current situation: no    Social History  Living Environment: Patient lives with daughter in a single level home, with no steps, with   Functional Level: Prior to admission patient ambulated without assistive device, was independent in ADL's, and was independent in IADL's.  Equipment Used at Home: none  Equipment owned (not currently used): none.  Assistance Upon Discharge: family.        Objective:     Patient found with HOB elevated with telemetry, oxygen  upon PT entry to room.    General Precautions: Standard, contact, droplet   Orthopedic Precautions:N/A   Braces:  N/A  Respiratory Status: 1 liters/min O2 via nasal cannula    Vitals   At Rest (pre-session)  BP  NT/NT   HR  NT   O2 Sat %  85    85% on room air at rest   With Activity (post-session)  BP  NT/NT   HR  NT   O2 Sat %  70s%-80s%     SpO2 85% at rest on room air  Applied 1L/min of SpO2 and O2 increased to 94% at rest  Pt stood and sided stepped left and R 3 steps each direction x 3 and pt asking for supplemental O2 and sat unprompted.  SpO2 showing 70s% to 80s% with questionable accuracy (low perfusion noted on vitals machine)  Incresed SpO2 to 4L/min and pt recovered to >90% supine in bed.   Pt returned to 1L/min at rest and RN notified.  RN to listen to lungs following session.    Questionble accuracy of SpO2 readings 2/2 low perfusion. RN notified.    Unable to complete 6 minute walk test 2/2 limited endurance and pt unable to ambulate away from EOB.   Exams:  Orientation: Patient is oriented to person, place, time, situation  Commands: Patient follows commands consistently  BILAT UE ROM/strength - defer to OT - see OT note for details  RLE ROM: WFL  RLE Strength: WFL  LLE ROM: WFL  LLE Strength: WFL    Functional Mobility:    Bed Mobility:  Rolling Left: independence  Supine to Sit: independence  Sit to Supine: independence    Transfers:  Sit to Stand: stand by assistance with no assistive  device  Stand to Sit: stand by assistance with no assistive device    Gait:  Patient ambulated 4ft with hand-held assist and contact guard assistance.  Patient demonstrates :       occasional unsteady gait      slowed, guarded, time consuming movements - all transitional activities.    Other Mobility:  N/A    Balance:  Sit  Static: NORMAL: No deviations seen in posture held statically  Dynamic: NORMAL: No deviations seen in posture held dynamically  Stand  Static: FAIR: Maintains without assist but unable to take challenges  Dynamic: FAIR: Needs CONTACT GUARD during gait    Additional Treatment Session  N/A    Patient left with HOB elevated with all lines intact, call button in reach, and patient's nurse notified.    DME Justifications:  No DME recommended requiring DME justifications    Education     Patient educated on the importance of early mobility to prevent functional decline during hospital stay.  Patient educated on and assisted with functional mobility as noted above.  Patient educated on PT Plan of Care and role of PT in acute care.  Patient was instructed to utilize staff assistance for mobility/transfers.  White board updated regarding patient's safest level of mobility with staff assistance    Goals     Multidisciplinary Problems       Physical Therapy Goals       Not on file                  History:     Past Medical History:   Diagnosis Date    COPD (chronic obstructive pulmonary disease)     HTN (hypertension)     Tobacco use      Past Surgical History:   Procedure Laterality Date    APPENDECTOMY       Time Tracking:     PT Received On: 06/23/25  PT Start Time: 0921     PT Stop Time: 0959  PT Total Time (min): 38 min     Billable Minutes: Evaluation LOW 38 mins     06/23/2025       [1]   Patient Active Problem List  Diagnosis    COPD with pneumonia    Hypothyroidism    Tobacco user    Family history of malignant neoplasm of colon    Arthritis of both hands    Pneumonia of right lower lobe due to  infectious organism    Severe sepsis    SOB (shortness of breath)    Aneurysm of descending thoracic aorta without rupture    Moderate malnutrition

## 2025-06-23 NOTE — PROGRESS NOTES
[facility] Medicine Wards   Progress Note      Resident Team: [facility] Medicine List 2  Attending Physician: Diana Cortes MD  Resident: Hoda  Intern: [facility] Length of Stay: 2 days    Subjective   Brief HPI:  Kristen Crowley is a 70 y.o. female with a PMHx of COPD, hypertension, and tobacco use who presents to [facility] on 06/20/2025 for shortness of breath, cough productive of thick, green sputum, fatigue, chills, decreased appetite, and generalized weakness x1 week.  Patient reports several members of her family have been diagnosed with flu over the last 1-2 weeks.  She reports that for the last several months she has been experiencing intermittent shortness of breaths and upper respiratory symptoms.  Over the past week, she states that she has been too weak to get out of bed to walk to the kitchen or the bathroom and reports episodes of urinary incontinence with coughing which is what prompted her to present to the ED today.  She has been using her albuterol and DuoNebs without much improvement in symptoms.  Not currently using a maintenance inhaler, although she has previously used Spiriva and Breztri in the past.  She denies fever, headache, unexpected weight loss, chest pain, palpitations rhinorrhea, sore throat, congestion, ear pain, nausea, vomiting, abdominal pain, dysuria, constipation, diarrhea, or focal weakness.  She admits to smoking about 1 ppd for approximately 50 years, but quit 2 months ago.  Reports occasional marijuana use, but denies any other illicit drug use.  Denies alcohol use.     ED Course:  Initial vitals significant for /83, , RR 24, SpO2 89% on room air.  Afebrile.  Patient received DuoNebs treatment upon arrival to the ED resulting in improvement in SpO2 to >92%.  Decadron 8 mg administered as well.  CBC significant for leukocytosis (WBC 19.87) with left shift and thrombocytosis (plt 511).  CMP notable for Na 134, Cl 93, and albumin 2.5.  CO2 29 indicating patient  more than likely has a chronic respiratory acidosis.  Initial lactic acid 2.6; repeat up-trended to 3.3.  CXR revealed hyperinflated lungs without evidence of focal consolidations or pleural effusions.  CT chest with contrast revealed scattered opacities in bilateral lung fields and an area of consolidation in the LLL favoring an infectious process with underlying emphysema.  Incidental thoracic aortic aneurysms noted as well.  Per sepsis protocol, patient received 1.5 L bolus of NS.  Blood cultures x2 were collected and patient was given Rocephin 1 g IV and Azithromycin 500 mg IV.  Internal Medicine was consulted for further evaluation and management of severe sepsis and CAP.    Interval History: Patient sitting up in bed, no acute distress. On 6/22/25, patient became hypoxic and required supplemental O2 of 2L NC. Sats improved and have been stable at 97%. Will attempt to wean O2 as tolerated. Will need 6 minute walk test to assess need for home O2. Discussed with patient importance of getting OOB and ambulating for continued improvement.     Review of Systems:  Review of Systems   Constitutional:  Negative for fever.   HENT:  Positive for sore throat. Negative for congestion.    Respiratory:  Positive for cough, sputum production and shortness of breath.    Cardiovascular:  Negative for chest pain.   Gastrointestinal:  Negative for abdominal pain, diarrhea, nausea and vomiting.   Neurological:  Positive for weakness (generalized). Negative for dizziness and headaches.     Objective     Vital Signs (Most Recent):  Temp: 97.8 °F (36.6 °C) (06/23/25 0452)  Pulse: 78 (06/23/25 0724)  Resp: 20 (06/23/25 0724)  BP: (!) 144/74 (06/23/25 0452)  SpO2: 99 % (06/23/25 0724) Vital Signs (24h Range):  Temp:  [97.2 °F (36.2 °C)-98.2 °F (36.8 °C)] 97.8 °F (36.6 °C)  Pulse:  [] 78  Resp:  [18-26] 20  SpO2:  [84 %-99 %] 99 %  BP: (123-182)/(73-99) 144/74     Physical Examination:  Physical Exam  Constitutional:        General: She is not in acute distress.     Appearance: She is not ill-appearing.   HENT:      Head: Normocephalic and atraumatic.      Nose: No congestion.      Mouth/Throat:      Mouth: Mucous membranes are moist.      Pharynx: Oropharynx is clear.   Eyes:      Conjunctiva/sclera: Conjunctivae normal.      Pupils: Pupils are equal, round, and reactive to light.   Cardiovascular:      Rate and Rhythm: Normal rate and regular rhythm.      Pulses: Normal pulses.      Heart sounds: Normal heart sounds.   Pulmonary:      Effort: Pulmonary effort is normal. No respiratory distress.      Breath sounds: Decreased breath sounds (bilateral lung fields) and wheezing (expiratory at lung bases) present. No rhonchi.      Comments: No increased work of breathing. On 2L NC  Abdominal:      General: Bowel sounds are normal. There is no distension.      Palpations: Abdomen is soft.      Tenderness: There is no abdominal tenderness.   Musculoskeletal:         General: No swelling or tenderness.   Skin:     General: Skin is warm and dry.      Capillary Refill: Capillary refill takes less than 2 seconds.   Neurological:      General: No focal deficit present.      Mental Status: She is alert.   Psychiatric:         Mood and Affect: Mood normal.       Laboratory:  Most Recent Data:  CBC:   Recent Labs   Lab 06/22/25  0328 06/23/25  0340   WBC 22.91* 16.34*   HGB 12.1 11.8*   HCT 36.9* 36.2*   * 474*     CMP:   Recent Labs   Lab 06/23/25  0340      CALCIUM 7.9*   ALBUMIN 2.2*   PROT 5.4*   *   K 3.8   CO2 24      BUN 17.6   CREATININE 0.61   ALKPHOS 78   ALT 16   AST 19   BILITOT 0.2       Recent Labs   Lab 06/20/25  2351   CHOL 162   TRIG 115   HDL 41   LDL 98.00     Hemoglobin A1c   Date Value Ref Range Status   06/21/2025 5.9 <=7.0 % Final     Microbiology Data:  Microbiology Results (last 7 days)       Procedure Component Value Units Date/Time    Respiratory Culture [[id number]] Collected: 06/20/25 2121     Order Status: Completed Specimen: Sputum, Expectorated Updated: 06/23/25 0702     Respiratory Culture Normal respiratory davis     GRAM STAIN Quality 3+      Few Gram positive cocci      Rare Gram Negative Rods      Rare Gram Positive Rods    Gram Stain [[id number]] Collected: 06/20/25 2122    Order Status: Completed Specimen: Sputum, Expectorated Updated: 06/23/25 0701     GRAM STAIN Quality 3+      Few Gram positive cocci      Rare Gram Negative Rods      Rare Gram Positive Rods    Blood Culture [[id number]]  (Normal) Collected: 06/20/25 1524    Order Status: Completed Specimen: Blood from Arm, Left Updated: 06/23/25 0002     Blood Culture No Growth At 48 Hours    Blood Culture [[id number]]  (Normal) Collected: 06/20/25 1524    Order Status: Completed Specimen: Blood from Arm, Right Updated: 06/23/25 0002     Blood Culture No Growth At 48 Hours    Respiratory Culture [[id number]]     Order Status: Canceled Specimen: Sputum, Expectorated     Respiratory Culture [[id number]]     Order Status: Canceled Specimen: Sputum, Expectorated           Respiratory Panel      Component  Ref Range & Units (hover) 1 d ago   Adenovirus Not Detected   Coronavirus 229E Not Detected   Coronavirus HKU1 Not Detected   Coronavirus NL63 Not Detected   Coronavirus OC43 PCR, Common Cold Virus Not Detected   Human Metapneumovirus Not Detected   Parainfluenza Virus 1 Not Detected   Parainfluenza Virus 2 Not Detected   Parainfluenza Virus 3 Not Detected   Parainfluenza Virus 4 Not Detected   Bordetella pertussis (ptxP) Not Detected   Chlamydia pneumoniae Not Detected   Mycoplasma pneumoniae Not Detected   Human Rhinovirus/Enterovirus Detected Abnormal    Bordetella parapertussis ([id number]) Not Detected          Other Results:  EKG:   Results for orders placed or performed during the hospital encounter of 06/20/25   EKG 12-LEAD    Collection Time: 06/20/25  9:28 PM   Result Value Ref Range    QRS Duration 88 ms    [facility] QTC Calculation 481 ms     Narrative    Test Reason : R06.02,    Vent. Rate : 115 BPM     Atrial Rate : 115 BPM     P-R Int : 138 ms          QRS Dur :  88 ms      QT Int : 348 ms       P-R-T Axes :  85 -16 110 degrees    QTcB Int : 481 ms    Sinus tachycardia  Right atrial enlargement  Septal infarct (cited on or before 04-May-2023)  T wave abnormality, consider anterolateral ischemia  Abnormal ECG  When compared with ECG of 19-Nov-2024 18:27,  ST no longer depressed in Anterior leads  T wave inversion now evident in Anterior-lateral leads    Referred By: AAAREFERRAL SELF           Confirmed By:          Radiology:  Imaging Results              CT Chest With Contrast (Final result)  Result time 06/20/25 18:12:32      Final result by Emerson Anaya MD (06/20/25 18:12:32)                   Impression:      1. Scattered opacities in both lungs favored infectious. Recommend a follow-up chest CT in 3 months to monitor.  2. Thoracic aortic aneurysms as discussed.      Electronically signed by: Emerson Anaya  Date:    06/20/2025  Time:    18:12               Narrative:    EXAMINATION:  CT CHEST WITH CONTRAST    CLINICAL HISTORY:  Cough, persistent;    TECHNIQUE:  Helical acquisition through the chest performed with IV contrast. Three plane reconstructions made available for review. DLP 63 mGycm. Automatic exposure control, adjustment of mA/kV or iterative reconstruction technique was used to reduce radiation.    COMPARISON:  No prior chest CT    FINDINGS:  There are no significantly enlarged thoracic lymph nodes.    The heart is not significantly enlarged.  No pericardial effusion.  There are coronary artery calcifications.  There is dilatation of the ascending thoracic aorta up to 4.5 cm.  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  There is also aneurysm of the descending thoracic aorta measuring up to 5.1 cm on image 39 series 2.  Large amount of soft plaque within this  aneurysm.    There is no pleural effusion.  There is a subpleural area of consolidation in the left lower lobe on image 45 series 8 measuring up to 2-3 cm.  There are patchy nodular and tree-in-bud opacities elsewhere primarily in the left lung.  There is moderate emphysema.    There are no acute findings in the imaged upper abdomen.  There are no acute osseous findings.                                       X-Ray Chest PA And Lateral (Final result)  Result time 06/20/25 16:02:11      Final result by Humberto Murillo MD (06/20/25 16:02:11)                   Impression:      No acute pulmonary process appreciated.      Electronically signed by: Humberto Murillo  Date:    06/20/2025  Time:    16:02               Narrative:    EXAMINATION:  XR CHEST PA AND LATERAL    CLINICAL HISTORY:  Shortness of breath    TECHNIQUE:  Frontal and lateral radiographs of the chest    COMPARISON:  Radiography 11/19/2024    FINDINGS:  Normal heart size.  Tortuosity of the thoracic aorta similar since November.  The lungs are well-inflated. No consolidation identified. No pleural effusion or discernible pneumothorax.                                      Current Medications:     Infusions:   lactated ringers   Intravenous Continuous 85 mL/hr at 06/23/25 0639 Rate Verify at 06/23/25 0639        Scheduled:   albuterol-ipratropium  3 mL Nebulization Q6H    doxycycline  100 mg Oral Q12H    guaiFENesin  600 mg Oral BID    polyethylene glycol  17 g Oral Daily    predniSONE  40 mg Oral Daily    senna-docusate  1 tablet Oral BID    tiotropium bromide  2 puff Inhalation Daily        PRN:    Current Facility-Administered Medications:     acetaminophen, 650 mg, Oral, Q8H PRN    hydrALAZINE, 10 mg, Intravenous, Q6H PRN    ibuprofen, 400 mg, Oral, Q6H PRN    labetalol, 10 mg, Intravenous, Q6H PRN    melatonin, 6 mg, Oral, Nightly PRN    ondansetron, 4 mg, Intravenous, Q8H PRN    sodium chloride 0.9%, 10 mL, Intravenous, PRN    sodium chloride 0.9%, 10  mL, Intravenous, PRN    sodium chloride 0.9%, 10 mL, Intravenous, PRN    Antibiotics and Day Number of Therapy:  One dose of Azithromycin and Rocephin given in ED. Initiated on Doxycycline    Intake/Output Summary (Last 24 hours) at 6/23/2025 0755  Last data filed at 6/23/2025 0639  Gross per 24 hour   Intake 4369.53 ml   Output 1200 ml   Net 3169.53 ml       Lines/Drains/Airways       Drain  Duration             Female External Urinary Catheter w/ Suction 06/20/25 2117 2 days              Peripheral Intravenous Line  Duration             Peripheral IV 06/21/25 1400 22 G Posterior;Right Hand 1 day                    Assessment and Plan    Severe sepsis with lactic acidosis   CAP of the LLL, non-severe   History of COPD  History of tobacco use  - SIRS 3/4 (, RR 24, WBC 19.87)  - CXR revealed hyperinflated lungs without evidence of focal consolidations or pleural effusions.    - CT chest with contrast revealed scattered opacities in bilateral lung fields and an area of consolidation in the LLL favoring an infectious process with underlying emphysema.   - Received DuoNeb treatments x2 and Decadron 8 mg in the ED  - Sepsis protocol initiated in the ED. Patient received 30 cc/kg Rocephin 1 g IV and Azithromycin 500 mg IV.  - COVID/FLU/RSV negative.  Respiratory panel positive for Rhinovirus. MRSA PCR negative. Blood cultures x 2 NG @ 48 hrs. Respiratory culture and Gram stain collected and pending.  - Will continue treating for non-severe CAP and atypical organisms with Doxycycline 100 mg PO BID.  Azithromycin and Levaquin contraindicated given QTc prolongation.  - Leukocytosis improving  - Initial lactic acid 2.6, repeat uptrended to 3.3. Following IVF resuscitation improved to 1.8.   - Received Decadron 8 mg in the ED. Continue with prednisone 40 mg daily.   - DuoNebs and incentive spirometry q4h while awake.  Mucinex BID.   - Supplemental oxygen as needed to maintain SpO2 88-92%.  Continuous pulse oximetry.  -  CXR ordered 6/23/25     Thoracic aortic aneurysms   Prolonged QTc  History of hypertension  History of hyperlipidemia  - Incidental thoracic aortic aneurysms noted on CT chest.  Ascending thoracic aortic dilation measuring 4.5 cm; saccular aneurysm off of the distal aortic arch measuring about 2 cm; descending thoracic aortic aneurysm measuring 5.1 cm.  No previous CTs on file for comparison.  - Patient will need repeat imaging in approximately 6 months for continued monitoring of thoracic aortic aneurysms.  - Abdominal ultrasound to screen for AAA on Monday or schedule OP  - EKG revealed sinus tachycardia with prolonged QTc 481. Avoid QT prolonging agents.   - /83 upon arrival.  Not currently taking any antihypertensive medications.  - PRN antihypertensives ordered  - Lipid panel wnl and A1c 5.9       CODE STATUS: Full  Access: pIV  Antibiotics: Rocephin/Doxycycline  Diet: Regular  DVT Prophylaxis: SCDs  GI Prophylaxis: --  Fluids: LR 85 cc/hr      Disposition: 70-year-old female admitted for severe sepsis secondary to non-severe community-acquired pneumonia.  Remains hemodynamically stable. Currently requiring 2L NC, sating 97%. Will attempt to wean as tolerated and get patient OOB and ambulating. Treating with Doxycycline and steroids given history of COPD.  Lactate normalized. Continue scheduled DuoNebs and incentive spirometry.  Discharge pending resolution of lactic acidosis and clinical improvement.       Renetta Young DO  [facility] HO-1

## 2025-06-24 LAB
ALBUMIN SERPL-MCNC: 2.3 G/DL (ref 3.4–4.8)
ALBUMIN/GLOB SERPL: 0.7 RATIO (ref 1.1–2)
ALP SERPL-CCNC: 83 UNIT/L (ref 40–150)
ALT SERPL-CCNC: 18 UNIT/L (ref 0–55)
ANION GAP SERPL CALC-SCNC: 10 MEQ/L
AST SERPL-CCNC: 19 UNIT/L (ref 11–45)
BASOPHILS # BLD AUTO: 0.02 X10(3)/MCL
BASOPHILS NFR BLD AUTO: 0.1 %
BILIRUB SERPL-MCNC: 0.2 MG/DL
BUN SERPL-MCNC: 14.7 MG/DL (ref 9.8–20.1)
CALCIUM SERPL-MCNC: 8.1 MG/DL (ref 8.4–10.2)
CHLORIDE SERPL-SCNC: 103 MMOL/L (ref 98–107)
CO2 SERPL-SCNC: 25 MMOL/L (ref 23–31)
CREAT SERPL-MCNC: 0.61 MG/DL (ref 0.55–1.02)
CREAT/UREA NIT SERPL: 24
EOSINOPHIL # BLD AUTO: 0 X10(3)/MCL (ref 0–0.9)
EOSINOPHIL NFR BLD AUTO: 0 %
ERYTHROCYTE [DISTWIDTH] IN BLOOD BY AUTOMATED COUNT: 14.6 % (ref 11.5–17)
GFR SERPLBLD CREATININE-BSD FMLA CKD-EPI: >60 ML/MIN/1.73/M2
GLOBULIN SER-MCNC: 3.5 GM/DL (ref 2.4–3.5)
GLUCOSE SERPL-MCNC: 144 MG/DL (ref 82–115)
HCT VFR BLD AUTO: 39.4 % (ref 37–47)
HGB BLD-MCNC: 12.7 G/DL (ref 12–16)
IMM GRANULOCYTES # BLD AUTO: 0.2 X10(3)/MCL (ref 0–0.04)
IMM GRANULOCYTES NFR BLD AUTO: 1.3 %
LYMPHOCYTES # BLD AUTO: 1.21 X10(3)/MCL (ref 0.6–4.6)
LYMPHOCYTES NFR BLD AUTO: 8 %
MAGNESIUM SERPL-MCNC: 1.9 MG/DL (ref 1.6–2.6)
MCH RBC QN AUTO: 29.5 PG (ref 27–31)
MCHC RBC AUTO-ENTMCNC: 32.2 G/DL (ref 33–36)
MCV RBC AUTO: 91.6 FL (ref 80–94)
MONOCYTES # BLD AUTO: 0.79 X10(3)/MCL (ref 0.1–1.3)
MONOCYTES NFR BLD AUTO: 5.2 %
NEUTROPHILS # BLD AUTO: 12.86 X10(3)/MCL (ref 2.1–9.2)
NEUTROPHILS NFR BLD AUTO: 85.4 %
NRBC BLD AUTO-RTO: 0 %
OHS QRS DURATION: 88 MS
OHS QTC CALCULATION: 481 MS
PHOSPHATE SERPL-MCNC: 2.7 MG/DL (ref 2.3–4.7)
PLATELET # BLD AUTO: 551 X10(3)/MCL (ref 130–400)
PMV BLD AUTO: 9 FL (ref 7.4–10.4)
POTASSIUM SERPL-SCNC: 4.1 MMOL/L (ref 3.5–5.1)
PROT SERPL-MCNC: 5.8 GM/DL (ref 5.8–7.6)
RBC # BLD AUTO: 4.3 X10(6)/MCL (ref 4.2–5.4)
SODIUM SERPL-SCNC: 138 MMOL/L (ref 136–145)
WBC # BLD AUTO: 15.08 X10(3)/MCL (ref 4.5–11.5)

## 2025-06-24 PROCEDURE — 94640 AIRWAY INHALATION TREATMENT: CPT

## 2025-06-24 PROCEDURE — 84100 ASSAY OF PHOSPHORUS: CPT

## 2025-06-24 PROCEDURE — 27000221 HC OXYGEN, UP TO 24 HOURS

## 2025-06-24 PROCEDURE — 25000003 PHARM REV CODE 250

## 2025-06-24 PROCEDURE — 25000242 PHARM REV CODE 250 ALT 637 W/ HCPCS

## 2025-06-24 PROCEDURE — 99900035 HC TECH TIME PER 15 MIN (STAT)

## 2025-06-24 PROCEDURE — 63600175 PHARM REV CODE 636 W HCPCS: Mod: JZ,TB

## 2025-06-24 PROCEDURE — 36415 COLL VENOUS BLD VENIPUNCTURE: CPT

## 2025-06-24 PROCEDURE — 94761 N-INVAS EAR/PLS OXIMETRY MLT: CPT

## 2025-06-24 PROCEDURE — A4216 STERILE WATER/SALINE, 10 ML: HCPCS

## 2025-06-24 PROCEDURE — 25000003 PHARM REV CODE 250: Performed by: INTERNAL MEDICINE

## 2025-06-24 PROCEDURE — 27000207 HC ISOLATION

## 2025-06-24 PROCEDURE — 21400001 HC TELEMETRY ROOM

## 2025-06-24 PROCEDURE — 83735 ASSAY OF MAGNESIUM: CPT

## 2025-06-24 PROCEDURE — 80053 COMPREHEN METABOLIC PANEL: CPT

## 2025-06-24 PROCEDURE — 85025 COMPLETE CBC W/AUTO DIFF WBC: CPT

## 2025-06-24 RX ORDER — PREDNISONE 20 MG/1
40 TABLET ORAL 2 TIMES DAILY
Status: DISCONTINUED | OUTPATIENT
Start: 2025-06-25 | End: 2025-06-27

## 2025-06-24 RX ORDER — BENZONATATE 100 MG/1
100 CAPSULE ORAL 3 TIMES DAILY PRN
Status: DISCONTINUED | OUTPATIENT
Start: 2025-06-24 | End: 2025-06-27 | Stop reason: HOSPADM

## 2025-06-24 RX ADMIN — IPRATROPIUM BROMIDE AND ALBUTEROL SULFATE 3 ML: .5; 3 SOLUTION RESPIRATORY (INHALATION) at 07:06

## 2025-06-24 RX ADMIN — GUAIFENESIN 600 MG: 600 TABLET, EXTENDED RELEASE ORAL at 09:06

## 2025-06-24 RX ADMIN — METHYLPREDNISOLONE SODIUM SUCCINATE 80 MG: 40 INJECTION, POWDER, FOR SOLUTION INTRAMUSCULAR; INTRAVENOUS at 05:06

## 2025-06-24 RX ADMIN — TIOTROPIUM BROMIDE INHALATION SPRAY 2 PUFF: 3.12 SPRAY, METERED RESPIRATORY (INHALATION) at 06:06

## 2025-06-24 RX ADMIN — DOXYCYCLINE HYCLATE 100 MG: 100 TABLET, COATED ORAL at 09:06

## 2025-06-24 RX ADMIN — DOXYCYCLINE HYCLATE 100 MG: 100 TABLET, COATED ORAL at 08:06

## 2025-06-24 RX ADMIN — BENZONATATE 100 MG: 100 CAPSULE ORAL at 04:06

## 2025-06-24 RX ADMIN — GUAIFENESIN 600 MG: 600 TABLET, EXTENDED RELEASE ORAL at 08:06

## 2025-06-24 RX ADMIN — CEFTRIAXONE SODIUM 1 G: 1 INJECTION, POWDER, FOR SOLUTION INTRAMUSCULAR; INTRAVENOUS at 10:06

## 2025-06-24 RX ADMIN — IPRATROPIUM BROMIDE AND ALBUTEROL SULFATE 3 ML: .5; 3 SOLUTION RESPIRATORY (INHALATION) at 06:06

## 2025-06-24 RX ADMIN — METHYLPREDNISOLONE SODIUM SUCCINATE 60 MG: 40 INJECTION, POWDER, FOR SOLUTION INTRAMUSCULAR; INTRAVENOUS at 05:06

## 2025-06-24 RX ADMIN — Medication 10 ML: at 05:06

## 2025-06-24 RX ADMIN — IPRATROPIUM BROMIDE AND ALBUTEROL SULFATE 3 ML: .5; 3 SOLUTION RESPIRATORY (INHALATION) at 02:06

## 2025-06-24 NOTE — PROGRESS NOTES
[facility]ards   Progress Note      Resident Team: [facility] Medicine List 2  Attending Physician: Diana Cortes MD  Resident: Wade  Intern: [facility] Length of Stay: 3 days    Subjective   Brief HPI:  Kristen Crowley is a 70 y.o. female with a PMHx of COPD, hypertension, and tobacco use who presents to [facility] on 06/20/2025 for shortness of breath, cough productive of thick, green sputum, fatigue, chills, decreased appetite, and generalized weakness x1 week.  Patient reports several members of her family have been diagnosed with flu over the last 1-2 weeks.  She reports that for the last several months she has been experiencing intermittent shortness of breaths and upper respiratory symptoms.  Over the past week, she states that she has been too weak to get out of bed to walk to the kitchen or the bathroom and reports episodes of urinary incontinence with coughing which is what prompted her to present to the ED today.  She has been using her albuterol and DuoNebs without much improvement in symptoms.  Not currently using a maintenance inhaler, although she has previously used Spiriva and Breztri in the past.  She denies fever, headache, unexpected weight loss, chest pain, palpitations rhinorrhea, sore throat, congestion, ear pain, nausea, vomiting, abdominal pain, dysuria, constipation, diarrhea, or focal weakness.  She admits to smoking about 1 ppd for approximately 50 years, but quit 2 months ago.  Reports occasional marijuana use, but denies any other illicit drug use.  Denies alcohol use.     ED Course:  Initial vitals significant for /83, , RR 24, SpO2 89% on room air.  Afebrile.  Patient received DuoNebs treatment upon arrival to the ED resulting in improvement in SpO2 to >92%.  Decadron 8 mg administered as well.  CBC significant for leukocytosis (WBC 19.87) with left shift and thrombocytosis (plt 511).  CMP notable for Na 134, Cl 93, and albumin 2.5.  CO2 29 indicating patient  more than likely has a chronic respiratory acidosis.  Initial lactic acid 2.6; repeat up-trended to 3.3.  CXR revealed hyperinflated lungs without evidence of focal consolidations or pleural effusions.  CT chest with contrast revealed scattered opacities in bilateral lung fields and an area of consolidation in the LLL favoring an infectious process with underlying emphysema.  Incidental thoracic aortic aneurysms noted as well.  Per sepsis protocol, patient received 1.5 L bolus of NS.  Blood cultures x2 were collected and patient was given Rocephin 1 g IV and Azithromycin 500 mg IV.  Internal Medicine was consulted for further evaluation and management of severe sepsis and CAP.    Interval History: Patient resting in bed, no acute distress. On supplemental O2 of 2L NC, sats > 91%. Will attempt to wean O2 as tolerated. Will need 6 minute walk test to assess need for home O2. Discussed with patient importance of getting OOB and ambulating for continued improvement.     Review of Systems:  Review of Systems   Constitutional:  Negative for fever.   HENT:  Positive for sore throat. Negative for congestion.    Respiratory:  Positive for cough, sputum production and shortness of breath.    Cardiovascular:  Negative for chest pain.   Gastrointestinal:  Negative for abdominal pain, diarrhea, nausea and vomiting.   Neurological:  Positive for weakness (generalized). Negative for dizziness and headaches.     Objective     Vital Signs (Most Recent):  Temp: 98.1 °F (36.7 °C) (06/24/25 0316)  Pulse: 70 (06/24/25 0601)  Resp: 16 (06/24/25 0601)  BP: (!) 140/72 (06/24/25 0320)  SpO2: 95 % (06/24/25 0601) Vital Signs (24h Range):  Temp:  [97.5 °F (36.4 °C)-98.4 °F (36.9 °C)] 98.1 °F (36.7 °C)  Pulse:  [70-96] 70  Resp:  [16-20] 16  SpO2:  [91 %-99 %] 95 %  BP: (128-166)/(69-81) 140/72     Physical Examination:  Physical Exam  Constitutional:       General: She is not in acute distress.     Appearance: She is not ill-appearing.    HENT:      Head: Normocephalic and atraumatic.      Nose: No congestion.      Mouth/Throat:      Mouth: Mucous membranes are moist.      Pharynx: Oropharynx is clear.   Eyes:      Conjunctiva/sclera: Conjunctivae normal.      Pupils: Pupils are equal, round, and reactive to light.   Cardiovascular:      Rate and Rhythm: Normal rate and regular rhythm.      Pulses: Normal pulses.      Heart sounds: Normal heart sounds.   Pulmonary:      Effort: Pulmonary effort is normal. No respiratory distress.      Breath sounds: Examination of the right-lower field reveals decreased breath sounds. Examination of the left-lower field reveals decreased breath sounds. Decreased breath sounds present.      Comments: No increased work of breathing. Wheezing improved. On 1L NC  Abdominal:      General: Bowel sounds are normal. There is no distension.      Palpations: Abdomen is soft.      Tenderness: There is no abdominal tenderness.   Musculoskeletal:         General: No swelling or tenderness.   Skin:     General: Skin is warm and dry.      Capillary Refill: Capillary refill takes less than 2 seconds.   Neurological:      General: No focal deficit present.      Mental Status: She is alert.   Psychiatric:         Mood and Affect: Mood normal.       Laboratory:  Most Recent Data:  CBC:   Recent Labs   Lab 06/23/25  0340 06/24/25  0346   WBC 16.34* 15.08*   HGB 11.8* 12.7   HCT 36.2* 39.4   * 551*     CMP:   Recent Labs   Lab 06/24/25  0346   *   CALCIUM 8.1*   ALBUMIN 2.3*   PROT 5.8      K 4.1   CO2 25      BUN 14.7   CREATININE 0.61   ALKPHOS 83   ALT 18   AST 19   BILITOT 0.2       Recent Labs   Lab 06/20/25  2351   CHOL 162   TRIG 115   HDL 41   LDL 98.00     Hemoglobin A1c   Date Value Ref Range Status   06/21/2025 5.9 <=7.0 % Final     Microbiology Data:  Microbiology Results (last 7 days)       Procedure Component Value Units Date/Time    Blood Culture [[id number]]  (Normal) Collected: 06/20/25 4044     Order Status: Completed Specimen: Blood from Arm, Left Updated: 06/24/25 0008     Blood Culture No Growth At 72 Hours    Blood Culture [[id number]]  (Normal) Collected: 06/20/25 1524    Order Status: Completed Specimen: Blood from Arm, Right Updated: 06/24/25 0008     Blood Culture No Growth At 72 Hours    Respiratory Culture [[id number]] Collected: 06/20/25 2121    Order Status: Completed Specimen: Sputum, Expectorated Updated: 06/23/25 0702     Respiratory Culture Normal respiratory davis     GRAM STAIN Quality 3+      Few Gram positive cocci      Rare Gram Negative Rods      Rare Gram Positive Rods    Gram Stain [[id number]] Collected: 06/20/25 2122    Order Status: Completed Specimen: Sputum, Expectorated Updated: 06/23/25 0701     GRAM STAIN Quality 3+      Few Gram positive cocci      Rare Gram Negative Rods      Rare Gram Positive Rods    Respiratory Culture [[id number]]     Order Status: Canceled Specimen: Sputum, Expectorated     Respiratory Culture [[id number]]     Order Status: Canceled Specimen: Sputum, Expectorated           Respiratory Panel      Component  Ref Range & Units (hover) 1 d ago   Adenovirus Not Detected   Coronavirus 229E Not Detected   Coronavirus HKU1 Not Detected   Coronavirus NL63 Not Detected   Coronavirus OC43 PCR, Common Cold Virus Not Detected   Human Metapneumovirus Not Detected   Parainfluenza Virus 1 Not Detected   Parainfluenza Virus 2 Not Detected   Parainfluenza Virus 3 Not Detected   Parainfluenza Virus 4 Not Detected   Bordetella pertussis (ptxP) Not Detected   Chlamydia pneumoniae Not Detected   Mycoplasma pneumoniae Not Detected   Human Rhinovirus/Enterovirus Detected Abnormal    Bordetella parapertussis (CZ1593) Not Detected          Other Results:  EKG:   Results for orders placed or performed during the hospital encounter of 06/20/25   EKG 12-LEAD    Collection Time: 06/20/25  9:28 PM   Result Value Ref Range    QRS Duration 88 ms    [facility] QTC Calculation 481 ms     Narrative    Test Reason : R06.02,    Vent. Rate : 115 BPM     Atrial Rate : 115 BPM     P-R Int : 138 ms          QRS Dur :  88 ms      QT Int : 348 ms       P-R-T Axes :  85 -16 110 degrees    QTcB Int : 481 ms    Sinus tachycardia  Right atrial enlargement  Septal infarct (cited on or before 04-May-2023)  T wave abnormality, consider anterolateral ischemia  Abnormal ECG  When compared with ECG of 19-Nov-2024 18:27,  T wave inversion now evident in Anterior-lateral leads  Confirmed by Elen Asif ([id number]) on 6/24/2025 12:14:43 AM    Referred By: AAAREFERRAL SELF           Confirmed By: Elen Asif         Radiology:  Imaging Results              CT Chest With Contrast (Final result)  Result time 06/20/25 18:12:32      Final result by Emerson Anaya MD (06/20/25 18:12:32)                   Impression:      1. Scattered opacities in both lungs favored infectious. Recommend a follow-up chest CT in 3 months to monitor.  2. Thoracic aortic aneurysms as discussed.      Electronically signed by: Emerson Anaya  Date:    06/20/2025  Time:    18:12               Narrative:    EXAMINATION:  CT CHEST WITH CONTRAST    CLINICAL HISTORY:  Cough, persistent;    TECHNIQUE:  Helical acquisition through the chest performed with IV contrast. Three plane reconstructions made available for review. DLP 63 mGycm. Automatic exposure control, adjustment of mA/kV or iterative reconstruction technique was used to reduce radiation.    COMPARISON:  No prior chest CT    FINDINGS:  There are no significantly enlarged thoracic lymph nodes.    The heart is not significantly enlarged.  No pericardial effusion.  There are coronary artery calcifications.  There is dilatation of the ascending thoracic aorta up to 4.5 cm.  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  There is also aneurysm of the descending thoracic aorta measuring up to 5.1 cm on image 39 series 2.  Large amount of soft  plaque within this aneurysm.    There is no pleural effusion.  There is a subpleural area of consolidation in the left lower lobe on image 45 series 8 measuring up to 2-3 cm.  There are patchy nodular and tree-in-bud opacities elsewhere primarily in the left lung.  There is moderate emphysema.    There are no acute findings in the imaged upper abdomen.  There are no acute osseous findings.                                       X-Ray Chest PA And Lateral (Final result)  Result time 06/20/25 16:02:11      Final result by Humberto Murillo MD (06/20/25 16:02:11)                   Impression:      No acute pulmonary process appreciated.      Electronically signed by: Humberto Murillo  Date:    06/20/2025  Time:    16:02               Narrative:    EXAMINATION:  XR CHEST PA AND LATERAL    CLINICAL HISTORY:  Shortness of breath    TECHNIQUE:  Frontal and lateral radiographs of the chest    COMPARISON:  Radiography 11/19/2024    FINDINGS:  Normal heart size.  Tortuosity of the thoracic aorta similar since November.  The lungs are well-inflated. No consolidation identified. No pleural effusion or discernible pneumothorax.                                      Current Medications:     Infusions:   lactated ringers   Intravenous Continuous 85 mL/hr at 06/23/25 2348 New Bag at 06/23/25 2348        Scheduled:   albuterol-ipratropium  3 mL Nebulization Q6H    cefTRIAXone (Rocephin) IV (PEDS and ADULTS)  1 g Intravenous Q24H    doxycycline  100 mg Oral Q12H    guaiFENesin  600 mg Oral BID    methylPREDNISolone injection (PEDS and ADULTS)  60 mg Intravenous Q12H    polyethylene glycol  17 g Oral Daily    [START ON 6/25/2025] predniSONE  40 mg Oral BID    senna-docusate  1 tablet Oral BID    tiotropium bromide  2 puff Inhalation Daily        PRN:    Current Facility-Administered Medications:     acetaminophen, 650 mg, Oral, Q8H PRN    benzonatate, 100 mg, Oral, TID PRN    hydrALAZINE, 10 mg, Intravenous, Q6H PRN    ibuprofen, 400 mg,  Oral, Q6H PRN    labetalol, 10 mg, Intravenous, Q6H PRN    melatonin, 6 mg, Oral, Nightly PRN    ondansetron, 4 mg, Intravenous, Q8H PRN    sodium chloride 0.9%, 10 mL, Intravenous, PRN      Intake/Output Summary (Last 24 hours) at 6/24/2025 0633  Last data filed at 6/24/2025 0000  Gross per 24 hour   Intake 5336.38 ml   Output 1050 ml   Net 4286.38 ml       Lines/Drains/Airways       Drain  Duration             Female External Urinary Catheter w/ Suction 06/20/25 2117 3 days              Peripheral Intravenous Line  Duration             Peripheral IV 06/21/25 1400 22 G Posterior;Right Hand 2 days                    Assessment and Plan    Severe sepsis with lactic acidosis   CAP of the LLL, non-severe   History of COPD  History of tobacco use  - SIRS 3/4 (, RR 24, WBC 19.87)  - Received DuoNeb treatments x2 and Decadron 8 mg in the ED  - Sepsis protocol initiated in the ED. Patient received 30 cc/kg Rocephin 1 g IV and Azithromycin 500 mg IV.  - Initial lactic acid 2.6, repeat uptrended to 3.3. Following IVF resuscitation improved to 1.8.   - COVID/FLU/RSV negative.  Respiratory panel positive for Rhinovirus. MRSA PCR negative. Blood cultures x 2 NG @ 48 hrs. Respiratory culture and Gram stain collected and pending.  - CXR 6/20/25 -- revealed hyperinflated lungs without evidence of focal consolidations or pleural effusions.    - CT chest with contrast 6/20/25 -- revealed scattered opacities in bilateral lung fields and an area of consolidation in the LLL favoring an infectious process with underlying emphysema.   - Repeat CXR 6/23/25 -- Developing right basilar pneumonia with small pleural effusion  - Will continue treating for non-severe CAP and atypical organisms with Doxycycline 100 mg PO BID (Day 4) and reinitiate Rocephin 1g q24h. Leukocytosis with daily downtrend  - Received Decadron 8 mg in the ED. Recived prednisone 40 mg daily for 3 days. Initiated on Solumedrol 80 mg on 6/23/25. Will taper dosage and  transition back to PO.  - DuoNebs and incentive spirometry q4h while awake. Mucinex BID. Chloraseptic spray PRN  - Supplemental oxygen as needed to maintain SpO2 88-92%. Continuous pulse oximetry.     Thoracic aortic aneurysms   Prolonged QTc  History of hypertension  History of hyperlipidemia  - Incidental thoracic aortic aneurysms noted on CT chest.  Ascending thoracic aortic dilation measuring 4.5 cm; saccular aneurysm off of the distal aortic arch measuring about 2 cm; descending thoracic aortic aneurysm measuring 5.1 cm.  No previous CTs on file for comparison.  - EKG revealed sinus tachycardia with prolonged QTc 481. Avoid QT prolonging agents.   - /83 upon arrival.  Not currently taking any antihypertensive medications.  - Echo 6/23/25 -- EF 40%, LV wall thickness with diastolic dysfunction. Moderate AV sclerosis with annular calcification and aortic regurgitation   - AAA U/S 6/23/25 -- No evidence of aneurysmal dilatation of the abdominal aorta   - Vascular surgery consulted for surgical evaluation. Appreciate assistance and recommendations.   - PRN antihypertensives ordered  - Lipid panel wnl and A1c 5.9       CODE STATUS: Full  Access: pIV  Antibiotics: Rocephin/Doxycycline  Diet: Regular  DVT Prophylaxis: SCDs  GI Prophylaxis: --  Fluids: LR 85 cc/hr      Disposition: 70-year-old female admitted for severe sepsis secondary to non-severe community-acquired pneumonia.  Remains hemodynamically stable. Currently requiring 2L NC, sating >91%. Will attempt to wean as tolerated and get patient OOB and ambulating. Treating with Doxycycline, Rocephin and steroids given history of COPD.  Lactate normalized. Continue scheduled DuoNebs and incentive spirometry.  Discharge pending clinical improvement.       Renetta Young DO  \[facility] [place] [facility]\"" Family Medicine HO-1

## 2025-06-24 NOTE — PT/OT/SLP PROGRESS
Physical Therapy    Missed Treatment Session - patient unwilling to participate note - 1010 - 06/24/2025    Patient Name:  Kristen Crowley   MRN:  [id number]      -patient not seen at this time secondary to patient unwilling to participate  -notified by patients nurse Hetal-patient declining participation w/ therapy today, patient wishes to use energy to wash up today (not participate in therapy)  -will follow-up as patient is appropriate/available/agreeable to participate and as therapists' schedule allows.

## 2025-06-24 NOTE — CONSULTS
[facility]/General Surgery  CONSULT NOTE  Chief Complaint:   Descending thoracic aneurysm    History of Present Illness:  Kristen Crowley is a 70 y.o. female with PMHx of COPD, hypertension, tobacco use who presented initially on 06/20 with COPD exacerbation as well as bilateral pneumonia.  She was admitted to medicine team for this an incidentally found to have descending thoracic aneurysm measuring 5.1 cm on CT scan.  Vascular surgery was consulted.  Patient is asymptomatic from this and has no prior imaging.     Review of Systems:  Negative other than stated in HPI on 10 point review of systems.     Allergies:  Review of patient's allergies indicates:  No Known Allergies    Home Medications:  No current facility-administered medications on file prior to encounter.     Current Outpatient Medications on File Prior to Encounter   Medication Sig    albuterol (PROVENTIL) 2.5 mg /3 mL (0.083 %) nebulizer solution Take 3 mLs (2.5 mg total) by nebulization every 4 to 6 hours as needed for Wheezing.    albuterol (PROVENTIL/VENTOLIN HFA) 90 mcg/actuation inhaler albuterol sulfate Take 2 puff(s) (inhalation) every 4-6 hours rinse mouth with water after each use [id number] HFA aerosol inhaler every 4-6 hours  inhalation No set duration recorded No set duration amount recorded active 90 mcg/actuation    albuterol-ipratropium (DUO-NEB) 2.5 mg-0.5 mg/3 mL nebulizer solution Take 3 mLs by nebulization every 6 (six) hours as needed for Wheezing. Rescue    tiotropium bromide (SPIRIVA RESPIMAT) 1.25 mcg/actuation inhaler Inhale 2 puffs into the lungs once daily. Controller    tiotropium bromide (SPIRIVA RESPIMAT) 2.5 mcg/actuation inhaler Inhale 2 puffs into the lungs Daily. Controller       Past Medical History:  Past Medical History:   Diagnosis Date    COPD (chronic obstructive pulmonary disease)     HTN (hypertension)     Tobacco use        Past Surgical History:  Past Surgical History:   Procedure Laterality Date     APPENDECTOMY         Family History:   Family History   Problem Relation Name Age of Onset    Heart failure Mother      Heart failure Father      Kidney disease Father      Heart disease Father      Obesity Sister      Diabetes Sister      Liver cancer Brother         Social History:   Social History[1]     Vital Signs:  Temp: 98 °F (36.7 °C) (06/24/25 1507)  Pulse: 98 (06/24/25 1507)  Resp: 16 (06/24/25 1404)  BP: (!) 153/78 (06/24/25 1507)  SpO2: (!) 94 % (06/24/25 1507)    Physical Exam:  General: NAD  Extremities: moving all extremities, distal pulses palpable and symmetric  Abdomen: S/ND/NT, no masses/hernias  Skin: turgor normal. No rashes or lesions  Neurologic: No focal numbness or weakness  Psych/Behavioral:  A&Ox3, appropriate affect.      Diagnostic Results:  CT Chest:   Impression:  1. Scattered opacities in both lungs favored infectious. Recommend a follow-up chest CT in 3 months to monitor.  2. Thoracic aortic aneurysms as discussed.    ASSESSMENT:     Kristen Crowley is a 70 y.o. female with PMHx of COPD, hypertension, tobacco use who presented for COPD exacerbation.  Incidentally found descending thoracic aortic aneurysm measuring 5.1 cm on CT scan.    PLAN:     - largest aneurysm measured closer to 4.7cm by vascular team  - no surgical intervention required for this at this time  - we will have her follow up in vascular clinic after discharge to discuss future screening.    Emerson Callahan MD  [facility] General Surgery PGY-3  5:09 PM         [1]   Social History  Tobacco Use    Smoking status: Every Day     Current packs/day: 0.50     Types: Cigarettes     Passive exposure: Current    Smokeless tobacco: Never   Substance Use Topics    Alcohol use: Never    Drug use: Not Currently     Types: Marijuana

## 2025-06-25 LAB
ALBUMIN SERPL-MCNC: 2.4 G/DL (ref 3.4–4.8)
ALBUMIN/GLOB SERPL: 0.8 RATIO (ref 1.1–2)
ALP SERPL-CCNC: 80 UNIT/L (ref 40–150)
ALT SERPL-CCNC: 20 UNIT/L (ref 0–55)
ANION GAP SERPL CALC-SCNC: 9 MEQ/L
AST SERPL-CCNC: 15 UNIT/L (ref 11–45)
BASOPHILS # BLD AUTO: 0.03 X10(3)/MCL
BASOPHILS NFR BLD AUTO: 0.2 %
BILIRUB SERPL-MCNC: 0.2 MG/DL
BUN SERPL-MCNC: 18.1 MG/DL (ref 9.8–20.1)
CALCIUM SERPL-MCNC: 8.2 MG/DL (ref 8.4–10.2)
CHLORIDE SERPL-SCNC: 104 MMOL/L (ref 98–107)
CO2 SERPL-SCNC: 25 MMOL/L (ref 23–31)
CREAT SERPL-MCNC: 0.63 MG/DL (ref 0.55–1.02)
CREAT/UREA NIT SERPL: 29
EOSINOPHIL # BLD AUTO: 0 X10(3)/MCL (ref 0–0.9)
EOSINOPHIL NFR BLD AUTO: 0 %
ERYTHROCYTE [DISTWIDTH] IN BLOOD BY AUTOMATED COUNT: 14.6 % (ref 11.5–17)
GFR SERPLBLD CREATININE-BSD FMLA CKD-EPI: >60 ML/MIN/1.73/M2
GLOBULIN SER-MCNC: 3.1 GM/DL (ref 2.4–3.5)
GLUCOSE SERPL-MCNC: 122 MG/DL (ref 82–115)
HCT VFR BLD AUTO: 37 % (ref 37–47)
HGB BLD-MCNC: 12.2 G/DL (ref 12–16)
IMM GRANULOCYTES # BLD AUTO: 0.32 X10(3)/MCL (ref 0–0.04)
IMM GRANULOCYTES NFR BLD AUTO: 1.6 %
LYMPHOCYTES # BLD AUTO: 1.51 X10(3)/MCL (ref 0.6–4.6)
LYMPHOCYTES NFR BLD AUTO: 7.7 %
MAGNESIUM SERPL-MCNC: 1.7 MG/DL (ref 1.6–2.6)
MCH RBC QN AUTO: 29.7 PG (ref 27–31)
MCHC RBC AUTO-ENTMCNC: 33 G/DL (ref 33–36)
MCV RBC AUTO: 90 FL (ref 80–94)
MONOCYTES # BLD AUTO: 1.25 X10(3)/MCL (ref 0.1–1.3)
MONOCYTES NFR BLD AUTO: 6.3 %
NEUTROPHILS # BLD AUTO: 16.61 X10(3)/MCL (ref 2.1–9.2)
NEUTROPHILS NFR BLD AUTO: 84.2 %
NRBC BLD AUTO-RTO: 0 %
PHOSPHATE SERPL-MCNC: 2.8 MG/DL (ref 2.3–4.7)
PLATELET # BLD AUTO: 562 X10(3)/MCL (ref 130–400)
PMV BLD AUTO: 8.7 FL (ref 7.4–10.4)
POTASSIUM SERPL-SCNC: 4.3 MMOL/L (ref 3.5–5.1)
PROT SERPL-MCNC: 5.5 GM/DL (ref 5.8–7.6)
RBC # BLD AUTO: 4.11 X10(6)/MCL (ref 4.2–5.4)
SODIUM SERPL-SCNC: 138 MMOL/L (ref 136–145)
WBC # BLD AUTO: 19.72 X10(3)/MCL (ref 4.5–11.5)

## 2025-06-25 PROCEDURE — 25000242 PHARM REV CODE 250 ALT 637 W/ HCPCS

## 2025-06-25 PROCEDURE — 97530 THERAPEUTIC ACTIVITIES: CPT

## 2025-06-25 PROCEDURE — 80053 COMPREHEN METABOLIC PANEL: CPT

## 2025-06-25 PROCEDURE — 85025 COMPLETE CBC W/AUTO DIFF WBC: CPT

## 2025-06-25 PROCEDURE — 99900035 HC TECH TIME PER 15 MIN (STAT)

## 2025-06-25 PROCEDURE — 94761 N-INVAS EAR/PLS OXIMETRY MLT: CPT

## 2025-06-25 PROCEDURE — 84100 ASSAY OF PHOSPHORUS: CPT

## 2025-06-25 PROCEDURE — 63600175 PHARM REV CODE 636 W HCPCS

## 2025-06-25 PROCEDURE — 25000003 PHARM REV CODE 250

## 2025-06-25 PROCEDURE — 83735 ASSAY OF MAGNESIUM: CPT

## 2025-06-25 PROCEDURE — 21400001 HC TELEMETRY ROOM

## 2025-06-25 PROCEDURE — 94640 AIRWAY INHALATION TREATMENT: CPT

## 2025-06-25 PROCEDURE — 36415 COLL VENOUS BLD VENIPUNCTURE: CPT

## 2025-06-25 PROCEDURE — 27000207 HC ISOLATION

## 2025-06-25 PROCEDURE — 27000221 HC OXYGEN, UP TO 24 HOURS

## 2025-06-25 PROCEDURE — 97116 GAIT TRAINING THERAPY: CPT

## 2025-06-25 RX ORDER — IPRATROPIUM BROMIDE AND ALBUTEROL SULFATE 2.5; .5 MG/3ML; MG/3ML
3 SOLUTION RESPIRATORY (INHALATION) EVERY 6 HOURS PRN
Status: DISCONTINUED | OUTPATIENT
Start: 2025-06-25 | End: 2025-06-27 | Stop reason: HOSPADM

## 2025-06-25 RX ORDER — IPRATROPIUM BROMIDE AND ALBUTEROL SULFATE 2.5; .5 MG/3ML; MG/3ML
3 SOLUTION RESPIRATORY (INHALATION)
Status: DISCONTINUED | OUTPATIENT
Start: 2025-06-25 | End: 2025-06-25

## 2025-06-25 RX ADMIN — DOXYCYCLINE HYCLATE 100 MG: 100 TABLET, COATED ORAL at 08:06

## 2025-06-25 RX ADMIN — SACUBITRIL AND VALSARTAN 1 TABLET: 24; 26 TABLET, FILM COATED ORAL at 08:06

## 2025-06-25 RX ADMIN — GUAIFENESIN 600 MG: 600 TABLET, EXTENDED RELEASE ORAL at 08:06

## 2025-06-25 RX ADMIN — PREDNISONE 40 MG: 20 TABLET ORAL at 04:06

## 2025-06-25 RX ADMIN — IPRATROPIUM BROMIDE AND ALBUTEROL SULFATE 3 ML: .5; 3 SOLUTION RESPIRATORY (INHALATION) at 06:06

## 2025-06-25 RX ADMIN — IPRATROPIUM BROMIDE AND ALBUTEROL SULFATE 3 ML: .5; 3 SOLUTION RESPIRATORY (INHALATION) at 01:06

## 2025-06-25 RX ADMIN — IPRATROPIUM BROMIDE AND ALBUTEROL SULFATE 3 ML: .5; 3 SOLUTION RESPIRATORY (INHALATION) at 07:06

## 2025-06-25 RX ADMIN — TIOTROPIUM BROMIDE INHALATION SPRAY 2 PUFF: 3.12 SPRAY, METERED RESPIRATORY (INHALATION) at 06:06

## 2025-06-25 RX ADMIN — CEFTRIAXONE SODIUM 1 G: 1 INJECTION, POWDER, FOR SOLUTION INTRAMUSCULAR; INTRAVENOUS at 10:06

## 2025-06-25 RX ADMIN — METHYLPREDNISOLONE SODIUM SUCCINATE 60 MG: 40 INJECTION, POWDER, FOR SOLUTION INTRAMUSCULAR; INTRAVENOUS at 04:06

## 2025-06-25 NOTE — PLAN OF CARE
Problem: Physical Therapy  Goal: Physical Therapy Goal  Description: Goals to be met by: Discharge     Patient will increase functional independence with mobility by performin. Sit to stand transfer with Modified [place] with RW  2. Gait  x 130 feet with Modified [place] using Rolling Walker with SpO2 >90%    Outcome: Progressing

## 2025-06-25 NOTE — PT/OT/SLP PROGRESS
Physical Therapy Treatment    Patient Name:  Kristen Crowley   MRN:  [id number]    Recommendations     Therapy Intensity Recommendations at Discharge: Low Intensity Therapy  Discharge Equipment Recommendations: walker, rolling, oxygen   Barriers to discharge: fall risk and decreased endurance    Assessment     Kristen Crowley is a 70 y.o. female admitted with a medical diagnosis of  Pneumonia of right lower lobe due to infectious organism.  1. SOB (shortness of breath)    2. COPD with pneumonia    3. Aneurysm of descending thoracic aorta without rupture    4. Routine check-up    5. Pneumonia of right lower lobe due to infectious organism       Problem List[1]   She presents with the following impairments/functional limitations:  weakness, impaired endurance, impaired functional mobility, gait instability, impaired balance, decreased lower extremity function, impaired cardiopulmonary response to activity.    Rehab Prognosis: Good.    Patient would benefit from continued skilled acute PT services to: address above listed impairments/functional limitations; receive patient/caregiver education; reduce fall risk; and maximize independency/safety with functional mobility.    Recent Surgery: * No surgery found *      Plan     During this hospitalization, patient to be seen 5 x/week to address the identified impairments/functional limitations via gait training, therapeutic activities, neuromuscular re-education, therapeutic exercises and progress toward the established goals.    Plan of Care Expires:  07/21/25    Subjective     Communicated with patient's nurse Hetal prior to session.    Patient agreeable to participate in treatment session.    Chief Complaint: Shortness of breath when ambulating without O2.  Pt does not want to wear mask.  Patient/Family Comments/goals: None stated   Pain/Comfort:  Pain Rating 1: 0/10  Pain Rating Post-Intervention 1: 0/10    Objective     Patient found Supine with telemetry, oxygen,  PureWick  upon PT entry to room.    General Precautions: Standard, contact, droplet   Orthopedic Precautions:N/A   Braces:  N/A  Respiratory Status: 1 liters/min O2 via nasal cannula when PT entered room    SpO2 88% on room air at rest  Pt walked 15 ft on room air with SpO2 dropping to 86%  Applied 2L/min of O2 and pt increased to 94%  Pt walked 50 ft and SpO2 93% on 2L/min of O2.      Functional Mobility:    Bed Mobility:  Rolling Left: independence  Supine to Sit: independence  Sit to Supine: independence    Transfers:  Sit to Stand: supervision with no assistive device  Stand to Sit: supervision with no assistive device  Increased time to perform bed mobility and transfers.      Gait:  Patient ambulated 15ft within room without AD with decreased steadiness and B knees flexed during stance bilaterally.  Pt then walked 50ft with RW with increased stability CGA with cues for turning RW  Patient demonstrates :       flexed posture      slowed, guarded, time consuming movements - all transitional activities.    Other Mobility:  N/A    Patient left with HOB elevated with all lines intact, call button in reach, and patient's nurse notified.  BSC brought into patient's room and pt educated on use of BSC preferred over purewick.     DME Justifications:   Kristen's mobility limitation cannot be sufficiently resolved by the use of a cane. Her functional mobility deficit can be sufficiently resolved with the use of a Rolling Walker. Patient's mobility limitation significantly impairs their ability to participate in one of more activities of daily living.  The use of a RW will significantly improve the patient's ability to participate in MRADLS and the patient will use it on regular basis in the home.    Education     Patient educated on and assisted with functional mobility as noted above.  Patient educated on PT Plan of Care.  Patient was instructed to utilize staff assistance for mobility/transfers.  White board updated  regarding patient's safest level of mobility with staff assistance    Goals     Multidisciplinary Problems       Physical Therapy Goals          Problem: Physical Therapy    Goal Priority Disciplines Outcome Interventions   Physical Therapy Goal     PT, PT/OT Progressing    Description: Goals to be met by: Discharge     Patient will increase functional independence with mobility by performin. Sit to stand transfer with Modified [place] with RW  2. Gait  x 130 feet with Modified [place] using Rolling Walker with SpO2 >90%                       Time Tracking     PT Received On: 25  PT Start Time: 924     PT Stop Time: 955  PT Total Time (min): 31 min     Billable Minutes: Gait Training 12 mins and Therapeutic Activity 17 mins     2025       [1]   Patient Active Problem List  Diagnosis    COPD with pneumonia    Hypothyroidism    Tobacco user    Family history of malignant neoplasm of colon    Arthritis of both hands    Pneumonia of right lower lobe due to infectious organism    Severe sepsis    SOB (shortness of breath)    Aneurysm of descending thoracic aorta without rupture    Moderate malnutrition

## 2025-06-25 NOTE — PROGRESS NOTES
[facility] Wards   Progress Note      Resident Team: [facility] Medicine List 2  Attending Physician: Diana Cortes MD  Resident: Hoda  Intern: [facility] Length of Stay: 4 days    Subjective   Brief HPI:  Kristen Crowley is a 70 y.o. female with a PMHx of COPD, hypertension, and tobacco use who presents to [facility] on 06/20/2025 for shortness of breath, cough productive of thick, green sputum, fatigue, chills, decreased appetite, and generalized weakness x1 week.  Patient reports several members of her family have been diagnosed with flu over the last 1-2 weeks.  She reports that for the last several months she has been experiencing intermittent shortness of breaths and upper respiratory symptoms.  Over the past week, she states that she has been too weak to get out of bed to walk to the kitchen or the bathroom and reports episodes of urinary incontinence with coughing which is what prompted her to present to the ED today.  She has been using her albuterol and DuoNebs without much improvement in symptoms.  Not currently using a maintenance inhaler, although she has previously used Spiriva and Breztri in the past.  She denies fever, headache, unexpected weight loss, chest pain, palpitations rhinorrhea, sore throat, congestion, ear pain, nausea, vomiting, abdominal pain, dysuria, constipation, diarrhea, or focal weakness.  She admits to smoking about 1 ppd for approximately 50 years, but quit 2 months ago.  Reports occasional marijuana use, but denies any other illicit drug use.  Denies alcohol use.     ED Course:  Initial vitals significant for /83, , RR 24, SpO2 89% on room air.  Afebrile.  Patient received DuoNebs treatment upon arrival to the ED resulting in improvement in SpO2 to >92%.  Decadron 8 mg administered as well.  CBC significant for leukocytosis (WBC 19.87) with left shift and thrombocytosis (plt 511).  CMP notable for Na 134, Cl 93, and albumin 2.5.  CO2 29 indicating patient  more than likely has a chronic respiratory acidosis.  Initial lactic acid 2.6; repeat up-trended to 3.3.  CXR revealed hyperinflated lungs without evidence of focal consolidations or pleural effusions.  CT chest with contrast revealed scattered opacities in bilateral lung fields and an area of consolidation in the LLL favoring an infectious process with underlying emphysema.  Incidental thoracic aortic aneurysms noted as well.  Per sepsis protocol, patient received 1.5 L bolus of NS.  Blood cultures x2 were collected and patient was given Rocephin 1 g IV and Azithromycin 500 mg IV.  Internal Medicine was consulted for further evaluation and management of severe sepsis and CAP.    Interval History: Patient resting in bed, no acute distress. On supplemental O2 of 1L NC, sats > 93%. Will continue to wean O2 as tolerated. Patient outlined prolonged history of her illness that has been ongoing since October 2024 obviously frustrated that she hasn't gotten better/keeps getting sick. Explained to patient that she is currently receiving all necessary medical therapy at this time but enforced importance of working with the staff. Discussion had with patient about importance of working with PT to regain strength and continued improvement during hospital stay. Patient stated she would do what she can with PT when they come and take her medications as ordered.     Review of Systems:  Review of Systems   Constitutional:  Negative for fever.   HENT:  Positive for sore throat (improved). Negative for congestion.    Respiratory:  Positive for cough, sputum production and shortness of breath (improved).    Cardiovascular:  Negative for chest pain.   Gastrointestinal:  Negative for abdominal pain, diarrhea, nausea and vomiting.   Neurological:  Positive for weakness (generalized). Negative for dizziness and headaches.     Objective     Vital Signs (Most Recent):  Temp: 98.4 °F (36.9 °C) (06/25/25 0330)  Pulse: 76 (06/25/25  0611)  Resp: 18 (06/25/25 0611)  BP: 135/72 (06/25/25 0330)  SpO2: (!) 93 % (06/25/25 0611) Vital Signs (24h Range):  Temp:  [97.3 °F (36.3 °C)-98.6 °F (37 °C)] 98.4 °F (36.9 °C)  Pulse:  [] 76  Resp:  [16-18] 18  SpO2:  [93 %-99 %] 93 %  BP: (124-174)/(70-80) 135/72     Physical Examination:  Physical Exam  Constitutional:       General: She is not in acute distress.     Appearance: She is not ill-appearing.   HENT:      Head: Normocephalic and atraumatic.      Nose: No congestion.      Mouth/Throat:      Mouth: Mucous membranes are moist.      Pharynx: Oropharynx is clear.   Eyes:      Conjunctiva/sclera: Conjunctivae normal.      Pupils: Pupils are equal, round, and reactive to light.   Cardiovascular:      Rate and Rhythm: Normal rate and regular rhythm.      Pulses: Normal pulses.      Heart sounds: Normal heart sounds.   Pulmonary:      Effort: Pulmonary effort is normal. No respiratory distress.      Breath sounds: Decreased air movement present. Examination of the right-lower field reveals decreased breath sounds. Decreased breath sounds present. No wheezing.      Comments: No increased work of breathing. On 0.5L NC  Abdominal:      General: Bowel sounds are normal. There is no distension.      Palpations: Abdomen is soft.      Tenderness: There is no abdominal tenderness.   Musculoskeletal:         General: No swelling or tenderness.   Skin:     General: Skin is warm and dry.      Capillary Refill: Capillary refill takes less than 2 seconds.   Neurological:      General: No focal deficit present.      Mental Status: She is alert.   Psychiatric:         Mood and Affect: Mood normal.       Laboratory:  Most Recent Data:  CBC:   Recent Labs   Lab 06/24/25  0346 06/25/25  0413   WBC 15.08* 19.72*   HGB 12.7 12.2   HCT 39.4 37.0   * 562*     CMP:   Recent Labs   Lab 06/25/25  0413   *   CALCIUM 8.2*   ALBUMIN 2.4*   PROT 5.5*      K 4.3   CO2 25      BUN 18.1   CREATININE 0.63    ALKPHOS 80   ALT 20   AST 15   BILITOT 0.2       Recent Labs   Lab 06/20/25  2351   CHOL 162   TRIG 115   HDL 41   LDL 98.00     Hemoglobin A1c   Date Value Ref Range Status   06/21/2025 5.9 <=7.0 % Final     Microbiology Data:  Microbiology Results (last 7 days)       Procedure Component Value Units Date/Time    Blood Culture [[id number]]  (Normal) Collected: 06/20/25 1524    Order Status: Completed Specimen: Blood from Arm, Left Updated: 06/25/25 0006     Blood Culture No Growth At 96 Hours    Blood Culture [[id number]]  (Normal) Collected: 06/20/25 1524    Order Status: Completed Specimen: Blood from Arm, Right Updated: 06/25/25 0006     Blood Culture No Growth At 96 Hours    Respiratory Culture [[id number]] Collected: 06/20/25 2121    Order Status: Completed Specimen: Sputum, Expectorated Updated: 06/23/25 0702     Respiratory Culture Normal respiratory davis     GRAM STAIN Quality 3+      Few Gram positive cocci      Rare Gram Negative Rods      Rare Gram Positive Rods    Gram Stain [[id number]] Collected: 06/20/25 2122    Order Status: Completed Specimen: Sputum, Expectorated Updated: 06/23/25 0701     GRAM STAIN Quality 3+      Few Gram positive cocci      Rare Gram Negative Rods      Rare Gram Positive Rods    Respiratory Culture [[id number]]     Order Status: Canceled Specimen: Sputum, Expectorated     Respiratory Culture [[id number]]     Order Status: Canceled Specimen: Sputum, Expectorated           Respiratory Panel      Component  Ref Range & Units (hover) 1 d ago   Adenovirus Not Detected   Coronavirus 229E Not Detected   Coronavirus HKU1 Not Detected   Coronavirus NL63 Not Detected   Coronavirus OC43 PCR, Common Cold Virus Not Detected   Human Metapneumovirus Not Detected   Parainfluenza Virus 1 Not Detected   Parainfluenza Virus 2 Not Detected   Parainfluenza Virus 3 Not Detected   Parainfluenza Virus 4 Not Detected   Bordetella pertussis (ptxP) Not Detected   Chlamydia pneumoniae Not Detected    Mycoplasma pneumoniae Not Detected   Human Rhinovirus/Enterovirus Detected Abnormal    Bordetella parapertussis (MM1801) Not Detected          Other Results:  EKG:   Results for orders placed or performed during the hospital encounter of 06/20/25   EKG 12-LEAD    Collection Time: 06/20/25  9:28 PM   Result Value Ref Range    QRS Duration 88 ms    OHS QTC Calculation 481 ms    Narrative    Test Reason : R06.02,    Vent. Rate : 115 BPM     Atrial Rate : 115 BPM     P-R Int : 138 ms          QRS Dur :  88 ms      QT Int : 348 ms       P-R-T Axes :  85 -16 110 degrees    QTcB Int : 481 ms    Sinus tachycardia  Right atrial enlargement  Septal infarct (cited on or before 04-May-2023)  T wave abnormality, consider anterolateral ischemia  Abnormal ECG  When compared with ECG of 19-Nov-2024 18:27,  T wave inversion now evident in Anterior-lateral leads  Confirmed by Elen Asif ([id number]) on 6/24/2025 12:14:43 AM    Referred By: AAAREFERRAL SELF           Confirmed By: Elen Asif         Radiology:  Imaging Results              CT Chest With Contrast (Final result)  Result time 06/20/25 18:12:32      Final result by Emerson Anaya MD (06/20/25 18:12:32)                   Impression:      1. Scattered opacities in both lungs favored infectious. Recommend a follow-up chest CT in 3 months to monitor.  2. Thoracic aortic aneurysms as discussed.      Electronically signed by: Emerson Anaya  Date:    06/20/2025  Time:    18:12               Narrative:    EXAMINATION:  CT CHEST WITH CONTRAST    CLINICAL HISTORY:  Cough, persistent;    TECHNIQUE:  Helical acquisition through the chest performed with IV contrast. Three plane reconstructions made available for review. DLP 63 mGycm. Automatic exposure control, adjustment of mA/kV or iterative reconstruction technique was used to reduce radiation.    COMPARISON:  No prior chest CT    FINDINGS:  There are no significantly enlarged thoracic lymph nodes.    The heart is not  significantly enlarged.  No pericardial effusion.  There are coronary artery calcifications.  There is dilatation of the ascending thoracic aorta up to 4.5 cm.  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  There is also aneurysm of the descending thoracic aorta measuring up to 5.1 cm on image 39 series 2.  Large amount of soft plaque within this aneurysm.    There is no pleural effusion.  There is a subpleural area of consolidation in the left lower lobe on image 45 series 8 measuring up to 2-3 cm.  There are patchy nodular and tree-in-bud opacities elsewhere primarily in the left lung.  There is moderate emphysema.    There are no acute findings in the imaged upper abdomen.  There are no acute osseous findings.                                       X-Ray Chest PA And Lateral (Final result)  Result time 06/20/25 16:02:11      Final result by Humberto Murillo MD (06/20/25 16:02:11)                   Impression:      No acute pulmonary process appreciated.      Electronically signed by: Humberto Murillo  Date:    06/20/2025  Time:    16:02               Narrative:    EXAMINATION:  XR CHEST PA AND LATERAL    CLINICAL HISTORY:  Shortness of breath    TECHNIQUE:  Frontal and lateral radiographs of the chest    COMPARISON:  Radiography 11/19/2024    FINDINGS:  Normal heart size.  Tortuosity of the thoracic aorta similar since November.  The lungs are well-inflated. No consolidation identified. No pleural effusion or discernible pneumothorax.                                      Current Medications:     Infusions:         Scheduled:   albuterol-ipratropium  3 mL Nebulization Q6H    cefTRIAXone (Rocephin) IV (PEDS and ADULTS)  1 g Intravenous Q24H    doxycycline  100 mg Oral Q12H    guaiFENesin  600 mg Oral BID    polyethylene glycol  17 g Oral Daily    predniSONE  40 mg Oral BID    sacubitriL-valsartan  1 tablet Oral BID    senna-docusate  1 tablet Oral BID     tiotropium bromide  2 puff Inhalation Daily        PRN:    Current Facility-Administered Medications:     acetaminophen, 650 mg, Oral, Q8H PRN    benzonatate, 100 mg, Oral, TID PRN    hydrALAZINE, 10 mg, Intravenous, Q6H PRN    ibuprofen, 400 mg, Oral, Q6H PRN    labetalol, 10 mg, Intravenous, Q6H PRN    melatonin, 6 mg, Oral, Nightly PRN    ondansetron, 4 mg, Intravenous, Q8H PRN    sodium chloride 0.9%, 10 mL, Intravenous, PRN      Intake/Output Summary (Last 24 hours) at 6/25/2025 0637  Last data filed at 6/25/2025 0335  Gross per 24 hour   Intake --   Output 900 ml   Net -900 ml       Lines/Drains/Airways       Drain  Duration             Female External Urinary Catheter w/ Suction 06/20/25 2117 4 days              Peripheral Intravenous Line  Duration             Peripheral IV Single Lumen 06/24/25 2150 20 G Left;Posterior Forearm <1 day                    Assessment and Plan    Severe sepsis with lactic acidosis   CAP of the LLL, non-severe   History of COPD  History of tobacco use  - SIRS 3/4 (, RR 24, WBC 19.87). Sepsis protocol initiated in the ED. Patient received 30 cc/kg Rocephin 1 g IV and Azithromycin 500 mg IV.  - Initial lactic acid 2.6, repeat uptrended to 3.3. Following IVF resuscitation improved to 1.8.   - COVID/FLU/RSV negative. Respiratory panel positive for Rhinovirus. MRSA PCR negative. Blood cultures x 2 NG @ 48 hrs. Respiratory culture and Gram stain with normal respiratory davis.  - CXR 6/20/25 -- revealed hyperinflated lungs without evidence of focal consolidations or pleural effusions.    - CT chest with contrast 6/20/25 -- revealed scattered opacities in bilateral lung fields and an area of consolidation in the LLL favoring an infectious process with underlying emphysema.   - Repeat CXR 6/23/25 -- Developing right basilar pneumonia with small pleural effusion  - Will continue treating for non-severe CAP and atypical organisms with Doxycycline 100 mg PO BID (Day 4) and reinitiate  Rocephin 1g q24h. Leukocytosis uptrend, likely 2/2 to steroids. Will CTM with daily labs  - Received Decadron 8 mg in the ED. Received prednisone 40 mg daily for 3 days. Initiated on Solumedrol 80 mg on 6/23/25. Will taper dosage and transition back to PO.  - DuoNebs and incentive spirometry q4h while awake. Mucinex BID. Chloraseptic spray PRN  - Will need 6 minute walk test to assess need for home O2.  - Supplemental oxygen as needed to maintain SpO2 88-92%. Continuous pulse oximetry.     Thoracic aortic aneurysms   Prolonged QTc  History of hypertension  History of hyperlipidemia  New Onset CHF, EF 40%  - Incidental thoracic aortic aneurysms noted on CT chest.  Ascending thoracic aortic dilation measuring 4.5 cm; saccular aneurysm off of the distal aortic arch measuring about 2 cm; descending thoracic aortic aneurysm measuring 5.1 cm.  No previous CTs on file for comparison.  - EKG revealed sinus tachycardia with prolonged QTc 481. Avoid QT prolonging agents.   - /83 upon arrival.  Not currently taking any antihypertensive medications.  - Echo 6/23/25 -- EF 40%, LV wall thickness with diastolic dysfunction. Moderate AV sclerosis with annular calcification and aortic regurgitation   - AAA U/S 6/23/25 -- No evidence of aneurysmal dilatation of the abdominal aorta   - Vascular surgery consulted for surgical evaluation. Largest aneurysm measured closer to 4.7cm by vascular team. No surgical intervention required for this at this time. Will need follow up in vascular clinic after discharge to discuss future screening. Appreciate assistance and recommendations.   - Continue Entresto BID. Will consider initiating a Beta-blocker. Will need Cariology follow up OP for GDMT optimization.   - PRN antihypertensives ordered  - Lipid panel wnl and A1c 5.9       CODE STATUS: Full  Access: pIV  Antibiotics: Rocephin/Doxycycline  Diet: Regular  DVT Prophylaxis: SCDs  GI Prophylaxis: --  Fluids: --      Disposition:  70-year-old female admitted for severe sepsis secondary to non-severe community-acquired pneumonia.  Remains hemodynamically stable. Minimal supplemental O2 requirement sating above 93%. Will attempt to wean as tolerated and get patient OOB and ambulating. Treating with Doxycycline, Rocephin and steroids given history of COPD. Continue scheduled DuoNebs and incentive spirometry. Discharge pending clinical improvement.       Renetta Young DO  [facility] Medicine HO-1

## 2025-06-25 NOTE — PROGRESS NOTES
Discussed recommendation for HH services with pt. Pt refused, stating she doesn't need it at this time.

## 2025-06-26 PROBLEM — R65.20 SEVERE SEPSIS: Status: RESOLVED | Noted: 2025-06-20 | Resolved: 2025-06-26

## 2025-06-26 PROBLEM — A41.9 SEVERE SEPSIS: Status: RESOLVED | Noted: 2025-06-20 | Resolved: 2025-06-26

## 2025-06-26 LAB
ALBUMIN SERPL-MCNC: 2.3 G/DL (ref 3.4–4.8)
ALBUMIN/GLOB SERPL: 0.8 RATIO (ref 1.1–2)
ALP SERPL-CCNC: 73 UNIT/L (ref 40–150)
ALT SERPL-CCNC: 15 UNIT/L (ref 0–55)
ANION GAP SERPL CALC-SCNC: 6 MEQ/L
AST SERPL-CCNC: 13 UNIT/L (ref 11–45)
BACTERIA BLD CULT: NORMAL
BACTERIA BLD CULT: NORMAL
BASOPHILS # BLD AUTO: 0.03 X10(3)/MCL
BASOPHILS NFR BLD AUTO: 0.2 %
BILIRUB SERPL-MCNC: 0.2 MG/DL
BUN SERPL-MCNC: 18.8 MG/DL (ref 9.8–20.1)
CALCIUM SERPL-MCNC: 8 MG/DL (ref 8.4–10.2)
CHLORIDE SERPL-SCNC: 105 MMOL/L (ref 98–107)
CO2 SERPL-SCNC: 25 MMOL/L (ref 23–31)
CREAT SERPL-MCNC: 0.6 MG/DL (ref 0.55–1.02)
CREAT/UREA NIT SERPL: 31
EOSINOPHIL # BLD AUTO: 0 X10(3)/MCL (ref 0–0.9)
EOSINOPHIL NFR BLD AUTO: 0 %
ERYTHROCYTE [DISTWIDTH] IN BLOOD BY AUTOMATED COUNT: 14.8 % (ref 11.5–17)
GFR SERPLBLD CREATININE-BSD FMLA CKD-EPI: >60 ML/MIN/1.73/M2
GLOBULIN SER-MCNC: 3 GM/DL (ref 2.4–3.5)
GLUCOSE SERPL-MCNC: 121 MG/DL (ref 82–115)
HCT VFR BLD AUTO: 37.9 % (ref 37–47)
HGB BLD-MCNC: 12.4 G/DL (ref 12–16)
IMM GRANULOCYTES # BLD AUTO: 0.3 X10(3)/MCL (ref 0–0.04)
IMM GRANULOCYTES NFR BLD AUTO: 1.6 %
LYMPHOCYTES # BLD AUTO: 0.99 X10(3)/MCL (ref 0.6–4.6)
LYMPHOCYTES NFR BLD AUTO: 5.4 %
MAGNESIUM SERPL-MCNC: 1.6 MG/DL (ref 1.6–2.6)
MCH RBC QN AUTO: 29.5 PG (ref 27–31)
MCHC RBC AUTO-ENTMCNC: 32.7 G/DL (ref 33–36)
MCV RBC AUTO: 90.2 FL (ref 80–94)
MONOCYTES # BLD AUTO: 0.8 X10(3)/MCL (ref 0.1–1.3)
MONOCYTES NFR BLD AUTO: 4.3 %
NEUTROPHILS # BLD AUTO: 16.35 X10(3)/MCL (ref 2.1–9.2)
NEUTROPHILS NFR BLD AUTO: 88.5 %
NRBC BLD AUTO-RTO: 0 %
PHOSPHATE SERPL-MCNC: 3.4 MG/DL (ref 2.3–4.7)
PLATELET # BLD AUTO: 581 X10(3)/MCL (ref 130–400)
PMV BLD AUTO: 8.8 FL (ref 7.4–10.4)
POTASSIUM SERPL-SCNC: 4.2 MMOL/L (ref 3.5–5.1)
PROT SERPL-MCNC: 5.3 GM/DL (ref 5.8–7.6)
RBC # BLD AUTO: 4.2 X10(6)/MCL (ref 4.2–5.4)
SODIUM SERPL-SCNC: 136 MMOL/L (ref 136–145)
WBC # BLD AUTO: 18.47 X10(3)/MCL (ref 4.5–11.5)

## 2025-06-26 PROCEDURE — 63600175 PHARM REV CODE 636 W HCPCS

## 2025-06-26 PROCEDURE — 94640 AIRWAY INHALATION TREATMENT: CPT

## 2025-06-26 PROCEDURE — 97530 THERAPEUTIC ACTIVITIES: CPT

## 2025-06-26 PROCEDURE — 97116 GAIT TRAINING THERAPY: CPT

## 2025-06-26 PROCEDURE — 21400001 HC TELEMETRY ROOM

## 2025-06-26 PROCEDURE — 84100 ASSAY OF PHOSPHORUS: CPT

## 2025-06-26 PROCEDURE — 94664 DEMO&/EVAL PT USE INHALER: CPT

## 2025-06-26 PROCEDURE — 85025 COMPLETE CBC W/AUTO DIFF WBC: CPT

## 2025-06-26 PROCEDURE — 80053 COMPREHEN METABOLIC PANEL: CPT

## 2025-06-26 PROCEDURE — 99900035 HC TECH TIME PER 15 MIN (STAT)

## 2025-06-26 PROCEDURE — 27000221 HC OXYGEN, UP TO 24 HOURS

## 2025-06-26 PROCEDURE — 25000242 PHARM REV CODE 250 ALT 637 W/ HCPCS

## 2025-06-26 PROCEDURE — 83735 ASSAY OF MAGNESIUM: CPT

## 2025-06-26 PROCEDURE — 25000003 PHARM REV CODE 250

## 2025-06-26 PROCEDURE — 27000207 HC ISOLATION

## 2025-06-26 PROCEDURE — 94761 N-INVAS EAR/PLS OXIMETRY MLT: CPT

## 2025-06-26 PROCEDURE — 36415 COLL VENOUS BLD VENIPUNCTURE: CPT

## 2025-06-26 RX ORDER — DOXYCYCLINE HYCLATE 100 MG
100 TABLET ORAL EVERY 12 HOURS
Qty: 8 TABLET | Refills: 0 | Status: SHIPPED | OUTPATIENT
Start: 2025-06-26 | End: 2025-06-30

## 2025-06-26 RX ORDER — FLUTICASONE FUROATE, UMECLIDINIUM BROMIDE AND VILANTEROL TRIFENATATE 100; 62.5; 25 UG/1; UG/1; UG/1
1 POWDER RESPIRATORY (INHALATION) DAILY
Qty: 60 EACH | Refills: 1 | Status: SHIPPED | OUTPATIENT
Start: 2025-06-26

## 2025-06-26 RX ORDER — PREDNISONE 5 MG/1
5 TABLET ORAL DAILY
Qty: 1 TABLET | Refills: 0 | Status: SHIPPED | OUTPATIENT
Start: 2025-06-26 | End: 2025-06-27

## 2025-06-26 RX ORDER — CEFDINIR 300 MG/1
300 CAPSULE ORAL 2 TIMES DAILY
Qty: 8 CAPSULE | Refills: 0 | Status: SHIPPED | OUTPATIENT
Start: 2025-06-26 | End: 2025-06-30

## 2025-06-26 RX ORDER — PREDNISONE 20 MG/1
TABLET ORAL
Qty: 3 TABLET | Refills: 0 | Status: SHIPPED | OUTPATIENT
Start: 2025-06-27 | End: 2025-06-29

## 2025-06-26 RX ADMIN — GUAIFENESIN 600 MG: 600 TABLET, EXTENDED RELEASE ORAL at 08:06

## 2025-06-26 RX ADMIN — DOXYCYCLINE HYCLATE 100 MG: 100 TABLET, COATED ORAL at 08:06

## 2025-06-26 RX ADMIN — CEFTRIAXONE SODIUM 1 G: 1 INJECTION, POWDER, FOR SOLUTION INTRAMUSCULAR; INTRAVENOUS at 10:06

## 2025-06-26 RX ADMIN — POLYETHYLENE GLYCOL 3350 17 G: 17 POWDER, FOR SOLUTION ORAL at 09:06

## 2025-06-26 RX ADMIN — SENNOSIDES AND DOCUSATE SODIUM 1 TABLET: 50; 8.6 TABLET ORAL at 09:06

## 2025-06-26 RX ADMIN — IPRATROPIUM BROMIDE AND ALBUTEROL SULFATE 3 ML: .5; 3 SOLUTION RESPIRATORY (INHALATION) at 10:06

## 2025-06-26 RX ADMIN — PREDNISONE 40 MG: 20 TABLET ORAL at 08:06

## 2025-06-26 RX ADMIN — SACUBITRIL AND VALSARTAN 1 TABLET: 24; 26 TABLET, FILM COATED ORAL at 08:06

## 2025-06-26 RX ADMIN — TIOTROPIUM BROMIDE INHALATION SPRAY 2 PUFF: 3.12 SPRAY, METERED RESPIRATORY (INHALATION) at 09:06

## 2025-06-26 RX ADMIN — IPRATROPIUM BROMIDE AND ALBUTEROL SULFATE 3 ML: .5; 3 SOLUTION RESPIRATORY (INHALATION) at 09:06

## 2025-06-26 RX ADMIN — SACUBITRIL AND VALSARTAN 1 TABLET: 24; 26 TABLET, FILM COATED ORAL at 09:06

## 2025-06-26 RX ADMIN — GUAIFENESIN 600 MG: 600 TABLET, EXTENDED RELEASE ORAL at 09:06

## 2025-06-26 RX ADMIN — PREDNISONE 40 MG: 20 TABLET ORAL at 09:06

## 2025-06-26 RX ADMIN — DOXYCYCLINE HYCLATE 100 MG: 100 TABLET, COATED ORAL at 09:06

## 2025-06-26 NOTE — PROGRESS NOTES
[facility] out of network with pt's insurance. No other providers can provide 02 with pneumonia dx.

## 2025-06-26 NOTE — PHYSICIAN QUERY
Please clarify the nutritional diagnosis associated with the clinical findings (include all that apply):  Moderate protein calorie malnutrition

## 2025-06-26 NOTE — NURSING
Patient reports shortness of breath this morning with physical therapy; request for nebz treatment this AM. Respiratory called; spoke to RT Mojgan. States will be up shortly. Patient to be made aware.

## 2025-06-26 NOTE — DISCHARGE SUMMARY
[facility] Internal Medicine Discharge Summary  [facility]    Admitting Physician: Diana Cortes MD  Attending Physician: Diana Cortes MD  Date of admit: 6/20/2025  Date of discharge: No discharge date for patient encounter.    Condition: Fair  Outcome: Condition has improved and patient is now ready for discharge.  Disposition: Home or Self Care    Hospital Course Summary and Problem List     Kristen Crowley is a 70 y.o. female with a PMHx of COPD, HTN, and tobacco use who presented to [facility] on 06/20/2025 for shortness of breath, cough productive of thick, green sputum, fatigue, chills, decreased appetite, and generalized weakness x1 week.  Reports for several months has been experiencing intermittent shortness of breath and upper respiratory symptoms. Over the past week, she states that she has been too weak to get out of bed to walk to the kitchen or the bathroom and reports episodes of urinary incontinence with coughing which is what prompted her to present to the ED today.  She has been using her albuterol and DuoNebs without much improvement in symptoms. History of about 1 ppd for approximately 50 years, but quit 2 months ago.  CXR revealed hyperinflated lungs without evidence of focal consolidations or pleural effusions.  CT chest with contrast revealed scattered opacities in bilateral lung fields and an area of consolidation in the LLL favoring an infectious process with underlying emphysema.  Incidental thoracic aortic aneurysms noted as well. Patient admitted for severe sepsis and CAP.     Patient initiated on oral steroids and continued on IV Rocephin and PO Doxycycline. Respiratory panel positive for Rhinovirus and IV Rocephin discontinued. Following patient worsened clinically and repeat CXR revealed developing right basilar pneumonia with small pleural effusion. IV rocephin was restarted and PO steroids switched to IV with taper. In total patient  "received 8 days of IV and PO antibiotics as well as 8 days of IV and oral steroids. PT evaluated patient and recommended patient required rolling walker at home. Due to pneumonia, patient does not currently qualify for at home O2 use, but recommend that following discharge and resolution of PNA, patient complete an additional 6 minute walk test to assess for home O2 requirement related to her COPD.     While inpatient echo and AAA u/s completed to work up incidental thoracic aortic aneurysms noted on CT. Surgery consulted while in patient and recommend that surgical intervention not indicated at this time, but follow up in vascular clinic to discuss future screening. Referral to vascular surgery placed at discharge. Despite clinical improvement, urinary incontinence persisted and an outpatient urology referral placed for further work up and management. Incidentally on labs, patient noted to have thrombocytosis, that could not be compared to previous 2/2 absence of previous labs. Heme/Onc referral placed for evaluation.     Patient stable for discharge with significant clinical improvement. Meds to bed delivered. Emphasized close outpatient follow up and keeping referral appointments.        Discharge Procedure Orders     WALKER FOR HOME USE     Order Specific Question Answer Comments   Type of Walker: Julien (4'4"-5'6")    With wheels? Yes    Height: 5' (1.524 m)    Weight: 42.4 kg (93 lb 7.6 oz)    Length of need (1-99 months): 99    Does patient have medical equipment at home? none    Please check all that apply: Patient's condition impairs ambulation.    Please check all that apply: Patient is unable to safely ambulate without equipment.      Ambulatory referral/consult to Hematology / Oncology   Standing Status: Future   Referral Priority: Routine Referral Type: Consultation   Referral Reason: Specialty Services Required   Requested Specialty: Hematology and Oncology   Number of Visits Requested: 1 "     Ambulatory referral/consult to Urology   Standing Status: Future   Referral Priority: Routine Referral Type: Consultation   Referral Reason: Specialty Services Required   Requested Specialty: Urology   Number of Visits Requested: 1     Ambulatory referral/consult to Internal Medicine   Standing Status: Future   Referral Priority: Routine Referral Type: Consultation   Referral Reason: Specialty Services Required   Requested Specialty: Internal Medicine   Number of Visits Requested: 1     Ambulatory referral/consult to Vascular Surgery   Standing Status: Future   Referral Priority: Routine Referral Type: Consultation   Referral Reason: Specialty Services Required   Requested Specialty: Vascular Surgery   Number of Visits Requested: 1       To address at [facility] Visit:  Significant medication changes: STOP Spiriva inhaler. START Trelogy. Continue antibiotic course for 4 days.  Results not resulted during admission: --  Recommended Labs: --  Recommended Imaging: --    Problem List  COPD  Hypertension  Tobacco use     Objective     Vital Signs:  Vitals  BP: 134/74  Temp: 97.7 °F (36.5 °C)  Temp Source: Oral  Pulse: 83  Resp: 18  SpO2: 96 %  Height: 5' (152.4 cm)  Weight: 42.4 kg (93 lb 7.6 oz)    Physical Exam  Physical Exam  Constitutional:       General: She is not in acute distress.     Appearance: She is not ill-appearing.   HENT:      Head: Normocephalic and atraumatic.      Nose: No congestion or rhinorrhea.      Mouth/Throat:      Mouth: Mucous membranes are moist.      Pharynx: Oropharynx is clear.   Eyes:      Conjunctiva/sclera: Conjunctivae normal.      Pupils: Pupils are equal, round, and reactive to light.   Cardiovascular:      Rate and Rhythm: Normal rate and regular rhythm.      Pulses: Normal pulses.      Heart sounds: No murmur heard.     No gallop.   Pulmonary:      Effort: Pulmonary effort is normal. No tachypnea or retractions.      Breath sounds: Decreased air movement present. No wheezing or  rhonchi.   Abdominal:      General: Bowel sounds are normal. There is no distension.      Palpations: Abdomen is soft.      Tenderness: There is no abdominal tenderness.   Musculoskeletal:         General: No swelling or tenderness.   Skin:     General: Skin is warm and dry.      Capillary Refill: Capillary refill takes less than 2 seconds.   Neurological:      General: No focal deficit present.      Mental Status: She is alert.   Psychiatric:         Mood and Affect: Mood normal.       Discharge Medications        Medication List        START taking these medications      cefdinir 300 MG capsule  Commonly known as: OMNICEF  Take 1 capsule (300 mg total) by mouth 2 (two) times daily. for 4 days     doxycycline 100 MG tablet  Commonly known as: VIBRA-TABS  Take 1 tablet (100 mg total) by mouth every 12 (twelve) hours. for 4 days     * predniSONE 5 MG tablet  Commonly known as: DELTASONE  Take 1 tablet (5 mg total) by mouth once daily. for 1 day     * predniSONE 20 MG tablet  Commonly known as: DELTASONE  Take 1 tablet (20 mg total) by mouth 2 (two) times daily for 1 day, THEN 0.5 tablets (10 mg total) 2 (two) times daily for 1 day.  Start taking on: June 27, 2025     sacubitriL-valsartan 24-26 mg per tablet  Commonly known as: ENTRESTO  Take 1 tablet by mouth 2 (two) times daily.     TRELEGY ELLIPTA 100-62.5-25 mcg Dsdv  Generic drug: fluticasone-umeclidin-vilanter  Inhale 1 puff into the lungs once daily.           * This list has 2 medication(s) that are the same as other medications prescribed for you. Read the directions carefully, and ask your doctor or other care provider to review them with you.                CONTINUE taking these medications      * albuterol 2.5 mg /3 mL (0.083 %) nebulizer solution  Commonly known as: PROVENTIL  Take 3 mLs (2.5 mg total) by nebulization every 4 to 6 hours as needed for Wheezing.     * albuterol 90 mcg/actuation inhaler  Commonly known as: PROVENTIL/VENTOLIN HFA  albuterol  sulfate Take 2 puff(s) (inhalation) every 4-6 hours rinse mouth with water after each use [id number] HFA aerosol inhaler every 4-6 hours  inhalation No set duration recorded No set duration amount recorded active 90 mcg/actuation           * This list has 2 medication(s) that are the same as other medications prescribed for you. Read the directions carefully, and ask your doctor or other care provider to review them with you.                STOP taking these medications      albuterol-ipratropium 2.5 mg-0.5 mg/3 mL nebulizer solution  Commonly known as: DUO-NEB     SPIRIVA RESPIMAT 1.25 mcg/actuation inhaler  Generic drug: tiotropium bromide     SPIRIVA RESPIMAT 2.5 mcg/actuation inhaler  Generic drug: tiotropium bromide               Where to Get Your Medications        These medications were sent to [address] [address]      Phone: [phone]   cefdinir 300 MG capsule  doxycycline 100 MG tablet  predniSONE 20 MG tablet  predniSONE 5 MG tablet  sacubitriL-valsartan 24-26 mg per tablet  TRELEGY ELLIPTA 100-62.5-25 mcg Dsdv       Outpatient Follow Up      Follow-up Information       [facility] Follow up in 1 week(s).    Specialty: Family Medicine  Why: Hospital follow up  Contact information:  [address] [place] [id number] [phone]                         At this time, Kristen Crowley is determined to have maximized benefits of IP hospitalization. she is discharged in stable condition with outpatient f/u recommendations and instructions. All questions were answered, and patient verbalized agreement with the POC. They were given return precautions prior to discharge including symptoms that should prompt return to ED or to call PCP.     Total time spent at discharge: >30 minutes    Renetta Young DO  [facility]

## 2025-06-26 NOTE — PT/OT/SLP PROGRESS
Physical Therapy Treatment    Patient Name:  Kristen Crowley   MRN:  [id number]    Recommendations     Therapy Intensity Recommendations at Discharge: Low Intensity Therapy  Discharge Equipment Recommendations: walker, rolling, oxygen   Barriers to discharge: fall risk and decreased endurance    Assessment     Kristen Crowley is a 70 y.o. female admitted with a medical diagnosis of  Pneumonia of right lower lobe due to infectious organism.  1. SOB (shortness of breath)    2. COPD with pneumonia    3. Aneurysm of descending thoracic aorta without rupture    4. Routine check-up    5. Pneumonia of right lower lobe due to infectious organism       Problem List[1]   She presents with the following impairments/functional limitations:  weakness, impaired endurance, gait instability, impaired functional mobility, impaired cardiopulmonary response to activity.    Rehab Prognosis: Good.    Patient would benefit from continued skilled acute PT services to: address above listed impairments/functional limitations; receive patient/caregiver education; reduce fall risk; and maximize independency/safety with functional mobility.    Recent Surgery: * No surgery found *      Plan     During this hospitalization, patient to be seen 5 x/week to address the identified impairments/functional limitations via therapeutic exercises, therapeutic activities, gait training and progress toward the established goals.    Plan of Care Expires:  07/21/25    Subjective     Communicated with patient's nurse Kajal prior to session.    Patient agreeable to participate in treatment session.    Chief Complaint: Shortness of breath when ambulating without O2.  Pt does not want to wear mask.  Patient/Family Comments/goals: None stated   Pain/Comfort:  Pain Rating 1: 0/10  Pain Rating Post-Intervention 1: 0/10    Objective     Patient found Supine with telemetry, oxygen, PureWick  upon PT entry to room.    General Precautions: Standard, droplet, contact,  fall   Orthopedic Precautions:N/A   Braces:  N/A  Respiratory Status: 1 liters/min O2 via nasal cannula when PT entered room    SpO2 90-95% on room air at rest  Pt walked 2 x 20 ft on room air with SpO2 dropping to 79%   seated recovery: O2 saturation returned to 88-92%  .      Functional Mobility:    Bed Mobility:  Rolling Left: independence  Supine to Sit: independence  Sit to Supine: independence    Transfers:  Sit to Stand: SBA with RW  Stand to Sit: SBA with RW  Stand pivot: SBA/CGA with RW  PT encouraged transfers to BSC vs using Pure Wick, but pt reported she couldn't at this time.      Gait:  Patient ambulated 2 x 20 ft within room with RW and needing MIN/CGA with decreased steadiness and B knees flexed during stance bilaterally.    Patient demonstrates :       flexed posture      slowed, guarded, time consuming movements - all transitional activities.  Poor stability  O2 saturation as low as 79% on room air    Other Mobility:  N/A    Patient left with HOB elevated with all lines intact, call button in reach, and patient's nurse notified.  BSC brought into patient's room and pt educated on use of BSC preferred over purewick.     DME Justifications:   Kristen's mobility limitation cannot be sufficiently resolved by the use of a cane. Her functional mobility deficit can be sufficiently resolved with the use of a Rolling Walker. Patient's mobility limitation significantly impairs their ability to participate in one of more activities of daily living.  The use of a RW will significantly improve the patient's ability to participate in MRADLS and the patient will use it on regular basis in the home.    Education     Patient educated on and assisted with functional mobility as noted above.  Patient educated on PT Plan of Care.  Patient was instructed to utilize staff assistance for mobility/transfers.  White board updated regarding patient's safest level of mobility with staff assistance    Goals      Multidisciplinary Problems       Physical Therapy Goals          Problem: Physical Therapy    Goal Priority Disciplines Outcome Interventions   Physical Therapy Goal     PT, PT/OT Progressing    Description: Goals to be met by: Discharge     Patient will increase functional independence with mobility by performin. Sit to stand transfer with Modified [place] with RW  2. Gait  x 130 feet with Modified [place] using Rolling Walker with SpO2 >90%                       Time Tracking     PT Received On: 25  PT Start Time: 09     PT Stop Time: 923  PT Total Time (min): 23 min     Billable Minutes: Gait Training 15 mins and Therapeutic Activity 8 mins     2025         [1]   Patient Active Problem List  Diagnosis    COPD with pneumonia    Hypothyroidism    Tobacco user    Family history of malignant neoplasm of colon    Arthritis of both hands    Pneumonia of right lower lobe due to infectious organism    Severe sepsis    SOB (shortness of breath)    Aneurysm of descending thoracic aorta without rupture    Moderate malnutrition

## 2025-06-26 NOTE — PROGRESS NOTES
[facility] Medicine Wards Progress Note     Resident Team: [facility] Medicine List 2  Attending Physician: Diana Cortes MD  Resident: Nimisha  Intern: Hannah       Subjective:      Brief HPI:  Kristen Crowley is a 70 y.o. female with a PMHx of COPD, hypertension, and tobacco use who presents to [facility] on 06/20/2025 for shortness of breath, cough productive of thick, green sputum, fatigue, chills, decreased appetite, and generalized weakness x1 week.  Patient reports several members of her family have been diagnosed with flu over the last 1-2 weeks.  She reports that for the last several months she has been experiencing intermittent shortness of breaths and upper respiratory symptoms.  Over the past week, she states that she has been too weak to get out of bed to walk to the kitchen or the bathroom and reports episodes of urinary incontinence with coughing which is what prompted her to present to the ED today.  She has been using her albuterol and DuoNebs without much improvement in symptoms.  Not currently using a maintenance inhaler, although she has previously used Spiriva and Breztri in the past.  She denies fever, headache, unexpected weight loss, chest pain, palpitations rhinorrhea, sore throat, congestion, ear pain, nausea, vomiting, abdominal pain, dysuria, constipation, diarrhea, or focal weakness.  She admits to smoking about 1 ppd for approximately 50 years, but quit 2 months ago.  Reports occasional marijuana use, but denies any other illicit drug use.  Denies alcohol use.     ED Course:  Initial vitals significant for /83, , RR 24, SpO2 89% on room air.  Afebrile.  Patient received DuoNebs treatment upon arrival to the ED resulting in improvement in SpO2 to >92%.  Decadron 8 mg administered as well.  CBC significant for leukocytosis (WBC 19.87) with left shift and thrombocytosis (plt 511).  CMP notable for Na 134, Cl 93, and albumin 2.5.  CO2 29 indicating patient more than likely has a chronic  respiratory acidosis.  Initial lactic acid 2.6; repeat up-trended to 3.3.  CXR revealed hyperinflated lungs without evidence of focal consolidations or pleural effusions.  CT chest with contrast revealed scattered opacities in bilateral lung fields and an area of consolidation in the LLL favoring an infectious process with underlying emphysema.  Incidental thoracic aortic aneurysms noted as well.  Per sepsis protocol, patient received 1.5 L bolus of NS.  Blood cultures x2 were collected and patient was given Rocephin 1 g IV and Azithromycin 500 mg IV.  Internal Medicine was consulted for further evaluation and management of severe sepsis and CAP.    Interval History: Overall improved, sitting up in bed. Able to ambulate with PT yesterday, requiring minimal O2. Will need rolling walker at discharge. Tolerating PO. Wheezing resolved.      Review of Systems:  ROS completed and negative except as indicated above.     Objective:     Last 24 Hour Vital Signs:  BP  Min: 116/64  Max: 147/71  Temp  Av.3 °F (36.8 °C)  Min: 97.9 °F (36.6 °C)  Max: 98.7 °F (37.1 °C)  Pulse  Av  Min: 57  Max: 102  Resp  Av.8  Min: 16  Max: 18  SpO2  Av.4 %  Min: 93 %  Max: 98 %  I/O last 3 completed shifts:  In: 240 [P.O.:240]  Out: 3400 [Urine:3400]    Physical Exam  Constitutional:       General: She is not in acute distress.     Appearance: She is not ill-appearing.   HENT:      Head: Normocephalic and atraumatic.      Nose: No congestion.      Mouth/Throat:      Mouth: Mucous membranes are moist.      Pharynx: Oropharynx is clear.   Eyes:      Conjunctiva/sclera: Conjunctivae normal.      Pupils: Pupils are equal, round, and reactive to light.   Cardiovascular:      Rate and Rhythm: Normal rate and regular rhythm.      Pulses: Normal pulses.      Heart sounds: No murmur heard.     No friction rub. No gallop.   Pulmonary:      Effort: Pulmonary effort is normal. No respiratory distress.      Breath sounds: Decreased air  movement present. Examination of the right-upper field reveals decreased breath sounds. Examination of the left-upper field reveals decreased breath sounds. Examination of the right-middle field reveals decreased breath sounds. Examination of the left-middle field reveals decreased breath sounds. Examination of the right-lower field reveals decreased breath sounds. Examination of the left-lower field reveals decreased breath sounds. Decreased breath sounds present. No wheezing.      Comments: No increased work of breathing. On 1L NC  Abdominal:      General: Bowel sounds are normal. There is no distension.      Palpations: Abdomen is soft.      Tenderness: There is no abdominal tenderness.   Musculoskeletal:         General: No swelling or tenderness.      Right lower leg: No edema.      Left lower leg: No edema.   Skin:     General: Skin is warm and dry.      Capillary Refill: Capillary refill takes less than 2 seconds.      Findings: No rash.   Neurological:      General: No focal deficit present.      Mental Status: She is alert.   Psychiatric:         Mood and Affect: Mood normal.           Laboratory:  Most Recent Data:  CBC:   Lab Results   Component Value Date    WBC 18.47 (H) 06/26/2025    HGB 12.4 06/26/2025    HCT 37.9 06/26/2025     (H) 06/26/2025    MCV 90.2 06/26/2025    RDW 14.8 06/26/2025     WBC Differential:   Recent Labs   Lab 06/22/25  0328 06/23/25  0340 06/24/25  0346 06/25/25  0413 06/26/25  0327   WBC 22.91* 16.34* 15.08* 19.72* 18.47*   HGB 12.1 11.8* 12.7 12.2 12.4   HCT 36.9* 36.2* 39.4 37.0 37.9   * 474* 551* 562* 581*   MCV 90.2 91.0 91.6 90.0 90.2     BMP:   Lab Results   Component Value Date     06/26/2025    K 4.2 06/26/2025     06/26/2025    CO2 25 06/26/2025    BUN 18.8 06/26/2025    CREATININE 0.60 06/26/2025     (H) 06/26/2025    CALCIUM 8.0 (L) 06/26/2025    MG 1.60 06/26/2025    PHOS 3.4 06/26/2025     LFTs:   Lab Results   Component Value Date     "PROT 5.3 (L) 06/26/2025    ALBUMIN 2.3 (L) 06/26/2025    BILITOT 0.2 06/26/2025    AST 13 06/26/2025    ALKPHOS 73 06/26/2025    ALT 15 06/26/2025     Coags: No results found for: "INR", "PROTIME", "PTT"  FLP:   Lab Results   Component Value Date    CHOL 162 06/20/2025    HDL 41 06/20/2025    TRIG 115 06/20/2025     DM:   Lab Results   Component Value Date    HGBA1C 5.9 06/21/2025    CREATININE 0.60 06/26/2025     Thyroid:   Lab Results   Component Value Date    TSH 1.421 06/20/2025      Anemia: No results found for: "IRON", "TIBC", "FERRITIN", "SATURATEDIRO"  No results found for: "MYBARIRU07"  No results found for: "FOLATE"     Cardiac: No results found for: "TROPONINI", "CKTOTAL", "CKMB", "BNP"      Microbiology Data:  Microbiology Results (last 7 days)       Procedure Component Value Units Date/Time    Blood Culture [[id number]]  (Normal) Collected: 06/20/25 1524    Order Status: Completed Specimen: Blood from Arm, Left Updated: 06/26/25 0008     Blood Culture No Growth at 5 days    Blood Culture [[id number]]  (Normal) Collected: 06/20/25 1524    Order Status: Completed Specimen: Blood from Arm, Right Updated: 06/26/25 0008     Blood Culture No Growth at 5 days    Respiratory Culture [[id number]] Collected: 06/20/25 2121    Order Status: Completed Specimen: Sputum, Expectorated Updated: 06/23/25 0702     Respiratory Culture Normal respiratory davis     GRAM STAIN Quality 3+      Few Gram positive cocci      Rare Gram Negative Rods      Rare Gram Positive Rods    Gram Stain [[id number]] Collected: 06/20/25 2122    Order Status: Completed Specimen: Sputum, Expectorated Updated: 06/23/25 0701     GRAM STAIN Quality 3+      Few Gram positive cocci      Rare Gram Negative Rods      Rare Gram Positive Rods    Respiratory Culture [[id number]]     Order Status: Canceled Specimen: Sputum, Expectorated     Respiratory Culture [[id number]]     Order Status: Canceled Specimen: Sputum, Expectorated              Other " Results:    Radiology:  Imaging Results              CT Chest With Contrast (Final result)  Result time 06/20/25 18:12:32      Final result by Emerson Anaya MD (06/20/25 18:12:32)                   Impression:      1. Scattered opacities in both lungs favored infectious. Recommend a follow-up chest CT in 3 months to monitor.  2. Thoracic aortic aneurysms as discussed.      Electronically signed by: Emerson Anaya  Date:    06/20/2025  Time:    18:12               Narrative:    EXAMINATION:  CT CHEST WITH CONTRAST    CLINICAL HISTORY:  Cough, persistent;    TECHNIQUE:  Helical acquisition through the chest performed with IV contrast. Three plane reconstructions made available for review. DLP 63 mGycm. Automatic exposure control, adjustment of mA/kV or iterative reconstruction technique was used to reduce radiation.    COMPARISON:  No prior chest CT    FINDINGS:  There are no significantly enlarged thoracic lymph nodes.    The heart is not significantly enlarged.  No pericardial effusion.  There are coronary artery calcifications.  There is dilatation of the ascending thoracic aorta up to 4.5 cm.  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  There is also aneurysm of the descending thoracic aorta measuring up to 5.1 cm on image 39 series 2.  Large amount of soft plaque within this aneurysm.    There is no pleural effusion.  There is a subpleural area of consolidation in the left lower lobe on image 45 series 8 measuring up to 2-3 cm.  There are patchy nodular and tree-in-bud opacities elsewhere primarily in the left lung.  There is moderate emphysema.    There are no acute findings in the imaged upper abdomen.  There are no acute osseous findings.                                       X-Ray Chest PA And Lateral (Final result)  Result time 06/20/25 16:02:11      Final result by Humberto Murillo MD (06/20/25 16:02:11)                   Impression:      No acute  pulmonary process appreciated.      Electronically signed by: Humberto Murillo  Date:    06/20/2025  Time:    16:02               Narrative:    EXAMINATION:  XR CHEST PA AND LATERAL    CLINICAL HISTORY:  Shortness of breath    TECHNIQUE:  Frontal and lateral radiographs of the chest    COMPARISON:  Radiography 11/19/2024    FINDINGS:  Normal heart size.  Tortuosity of the thoracic aorta similar since November.  The lungs are well-inflated. No consolidation identified. No pleural effusion or discernible pneumothorax.                                      Current Medications:     Infusions:       Scheduled:   cefTRIAXone (Rocephin) IV (PEDS and ADULTS)  1 g Intravenous Q24H    doxycycline  100 mg Oral Q12H    guaiFENesin  600 mg Oral BID    polyethylene glycol  17 g Oral Daily    predniSONE  40 mg Oral BID    sacubitriL-valsartan  1 tablet Oral BID    senna-docusate  1 tablet Oral BID    tiotropium bromide  2 puff Inhalation Daily        PRN:    Current Facility-Administered Medications:     acetaminophen, 650 mg, Oral, Q8H PRN    albuterol-ipratropium, 3 mL, Nebulization, Q6H PRN    benzonatate, 100 mg, Oral, TID PRN    hydrALAZINE, 10 mg, Intravenous, Q6H PRN    ibuprofen, 400 mg, Oral, Q6H PRN    labetalol, 10 mg, Intravenous, Q6H PRN    melatonin, 6 mg, Oral, Nightly PRN    ondansetron, 4 mg, Intravenous, Q8H PRN    sodium chloride 0.9%, 10 mL, Intravenous, PRN    Assessment & Plan:     Severe sepsis with lactic acidosis   CAP of the LLL, non-severe   History of COPD  History of tobacco use  - SIRS 3/4 (, RR 24, WBC 19.87). Sepsis protocol initiated in the ED. Patient received 30 cc/kg Rocephin 1 g IV and Azithromycin 500 mg IV.  - Initial lactic acid 2.6, repeat uptrended to 3.3. Following IVF resuscitation improved to 1.8.   - COVID/FLU/RSV negative. Respiratory panel positive for Rhinovirus. MRSA PCR negative. Blood cultures x 2 NG. Respiratory culture and Gram stain with normal respiratory davis.  - CXR  6/20/25 -- revealed hyperinflated lungs without evidence of focal consolidations or pleural effusions.    - CT chest with contrast 6/20/25 -- revealed scattered opacities in bilateral lung fields and an area of consolidation in the LLL favoring an infectious process with underlying emphysema.   - Repeat CXR 6/23/25 -- Developing right basilar pneumonia with small pleural effusion  - Will continue treating for non-severe CAP and atypical organisms with Doxycycline 100 mg PO BID (Day 6) and reinitiate Rocephin 1g q24h. Leukocytosis down trending, likely 2/2 to steroids. Will CTM with daily labs  - Received Decadron 8 mg in the ED. Received prednisone 40 mg daily for 3 days. Initiated on Solumedrol 80 mg on 6/23/25. Will taper dosage and transition back to PO.  - DuoNebs and incentive spirometry q6h PRN. Mucinex BID. Chloraseptic spray PRN  - Will need 6 minute walk test to assess need for home O2.  - Supplemental oxygen as needed to maintain SpO2 88-92%. Continuous pulse oximetry.     Thoracic aortic aneurysms   Prolonged QTc  History of hypertension  History of hyperlipidemia  New Onset CHF, EF 40%  - Incidental thoracic aortic aneurysms noted on CT chest.  Ascending thoracic aortic dilation measuring 4.5 cm; saccular aneurysm off of the distal aortic arch measuring about 2 cm; descending thoracic aortic aneurysm measuring 5.1 cm.  No previous CTs on file for comparison.  - EKG revealed sinus tachycardia with prolonged QTc 481. Avoid QT prolonging agents.   - /83 upon arrival.  Not currently taking any antihypertensive medications.  - Echo 6/23/25 -- EF 40%, LV wall thickness with diastolic dysfunction. Moderate AV sclerosis with annular calcification and aortic regurgitation   - AAA U/S 6/23/25 -- No evidence of aneurysmal dilatation of the abdominal aorta   - Vascular surgery consulted for surgical evaluation. Largest aneurysm measured closer to 4.7cm by vascular team. No surgical intervention required for  this at this time. Will need follow up in vascular clinic after discharge to discuss future screening. Appreciate assistance and recommendations.   - Continue Entresto BID. Will consider initiating a Beta-blocker. Will need Cardiology follow up OP for GDMT optimization.   - PRN antihypertensives ordered  - Lipid panel wnl and A1c 5.9         CODE STATUS: Full  Access: pIV  Antibiotics: Rocephin/Doxycycline  Diet: Regular  DVT Prophylaxis: SCDs  GI Prophylaxis: --  Fluids: --    Disposition: 70-year-old female admitted for severe sepsis secondary to non-severe community-acquired pneumonia.  Remains hemodynamically stable. Minimal supplemental O2 requirement sating above 93%. Will attempt to wean as tolerated and get patient OOB and ambulating. Treating with Doxycycline, Rocephin and steroids given history of COPD. Continue DuoNebs and incentive spirometry. Discharge tomorrow.      Lamont Bansal MD  [facility]

## 2025-06-26 NOTE — PROGRESS NOTES
Order for home 02 submitted to [facility]. Order for RW submitted to [facility]. Possible d/c today.

## 2025-06-26 NOTE — PROGRESS NOTES
Inpatient Nutrition Assessment    Admit Date: 6/20/2025   Total duration of encounter: 6 days   Patient Age: 70 y.o.    Nutrition Recommendation/Prescription     Continue regular diet; pt likes fruits with meals  Continue Boost TID (provides 240 kcal, 10 g protein per serving)   Consider daily MVI  Biweekly wt    Communication of Recommendations: reviewed with nurse and reviewed with patient    Nutrition Assessment     Malnutrition Assessment/Nutrition-Focused Physical Exam    Malnutrition Context: acute illness or injury (06/23/25 1028)  Malnutrition Level: moderate (06/23/25 1028)  Energy Intake (Malnutrition): less than or equal to 50% for greater than or equal to 5 days (06/23/25 1028)  Weight Loss (Malnutrition): other (see comments) (Does not meet criteria) (06/23/25 1028)        Muscle Mass (Malnutrition): mild depletion (06/23/25 1028)     Clavicle Bone Region (Muscle Loss): mild depletion        Fluid Accumulation (Malnutrition): other (see comments) (Not present) (06/23/25 1028)     Hand  Strength, Right (Malnutrition): Unable to assess (06/23/25 1028)  A minimum of two characteristics is recommended for diagnosis of either severe or non-severe malnutrition.    Chart Review    Reason Seen: follow-up    Malnutrition Screening Tool Results   Have you recently lost weight without trying?: No  Have you been eating poorly because of a decreased appetite?: No   MST Score: 0   Diagnosis:  Severe sepsis, CAD, hx COPD/tobacco use, thorasic aortic aneurysm, prolonged QTC, HTN, HLD     Relevant Medical History: COPD, HTN, Tobacco use     Scheduled Medications:  cefTRIAXone (Rocephin) IV (PEDS and ADULTS), 1 g, Q24H  doxycycline, 100 mg, Q12H  guaiFENesin, 600 mg, BID  polyethylene glycol, 17 g, Daily  predniSONE, 40 mg, BID  sacubitriL-valsartan, 1 tablet, BID  senna-docusate, 1 tablet, BID  tiotropium bromide, 2 puff, Daily    Continuous Infusions:     PRN Medications:  acetaminophen, 650 mg, Q8H  PRN  albuterol-ipratropium, 3 mL, Q6H PRN  benzonatate, 100 mg, TID PRN  hydrALAZINE, 10 mg, Q6H PRN  ibuprofen, 400 mg, Q6H PRN  labetalol, 10 mg, Q6H PRN  melatonin, 6 mg, Nightly PRN  ondansetron, 4 mg, Q8H PRN  sodium chloride 0.9%, 10 mL, PRN    Calorie Containing IV Medications: no significant kcals from medications at this time    Recent Labs   Lab 06/20/25  1424 06/20/25  2351 06/21/25  0507 06/22/25  0328 06/23/25  0340 06/24/25  0346 06/25/25  0413 06/26/25  0327   *  --  138 134* 135* 138 138 136   K 3.6  --  4.0 4.1 3.8 4.1 4.3 4.2   CALCIUM 8.4  --  7.9* 8.2* 7.9* 8.1* 8.2* 8.0*   PHOS  --   --  2.3 3.0 2.6 2.7 2.8 3.4   MG  --   --  2.10 2.00 1.80 1.90 1.70 1.60   CL 93*  --  102 102 103 103 104 105   CO2 29  --  27 23 24 25 25 25   BUN 22.9*  --  19.6 19.4 17.6 14.7 18.1 18.8   CREATININE 0.66  --  0.62 0.60 0.61 0.61 0.63 0.60   EGFRNORACEVR >60  --  >60 >60 >60 >60 >60 >60   *  --  113 116* 105 144* 122* 121*   BILITOT 0.2  --  0.2 0.2 0.2 0.2 0.2 0.2   ALKPHOS 124  --  96 90 78 83 80 73   ALT 19  --  17 18 16 18 20 15   AST 16  --  17 28 19 19 15 13   ALBUMIN 2.5*  --  2.2* 2.4* 2.2* 2.3* 2.4* 2.3*   TRIG  --  115  --   --   --   --   --   --    HGBA1C  --   --  5.9  --   --   --   --   --    WBC 19.87*  --  19.83* 22.91* 16.34* 15.08* 19.72* 18.47*   HGB 13.8  --  11.9* 12.1 11.8* 12.7 12.2 12.4   HCT 42.2  --  36.0* 36.9* 36.2* 39.4 37.0 37.9     Nutrition Orders:  Diet Adult Regular  Dietary nutrition supplements TID; Boost Original Nutritional Drink - Chocolate    Appetite/Oral Intake: good/50-75% of meals  Factors Affecting Nutritional Intake: shortness of breath  Social Needs Impacting Access to Food: none identified  Food/Faith/Cultural Preferences: likes fruit   Food Allergies: none reported  Last Bowel Movement: 06/24/25  Wound(s):  none    Comments    6/26:  Pt reports appetite much improved; states tolerating po diet and drinking oral nutrition supplement.  Denies n/v;  chew / swallow difficulties.  No new wts.  Labs / meds noted.      () Pt reported decreased po intake/appetite x 3 weeks 2 + SOB; bed wt during rounds 108#; ? Accuracy of bed scale; pt reported she has weighed 97# in ER; admit wt listed 93#; UBW 98#; will track wts. Pt with mild muscle wasting. Will order ONS for added protein. Labs acknowledged.     Anthropometrics    Height: 5' (152.4 cm), Height Method: Stated  Last Weight: 42.4 kg (93 lb 7.6 oz) (25 0724), Weight Method: Standard Scale  BMI (Calculated): 18.3  BMI Classification: underweight (BMI less than 18.5)     Ideal Body Weight (IBW), Female: 100 lb     % Ideal Body Weight, Female (lb): 93.48 %                    Usual Body Weight (UBW), k.5 kg  % Usual Body Weight: 95.48     Usual Weight Provided By: patient and EMR weight history    Wt Readings from Last 5 Encounters:   25 42.4 kg (93 lb 7.6 oz)   24 44.5 kg (98 lb)   23 47.6 kg (105 lb)   23 46.3 kg (102 lb)   23 45.9 kg (101 lb 3.2 oz)     Weight Change(s) Since Admission: UBW 98#;   Wt Readings from Last 1 Encounters:   25 0724 42.4 kg (93 lb 7.6 oz)   25 2117 42.4 kg (93 lb 7.6 oz)   25 1351 45.4 kg (100 lb)   Admit Weight: 45.4 kg (100 lb) (25 1351), Weight Method: Stated    : no new wts    Estimated Needs    Weight Used For Calorie Calculations: 42.4 kg (93 lb 7.6 oz)  Energy Calorie Requirements (kcal): 1484 kcal/d; 35 kcal/kg  Energy Need Method: Kcal/kg  Weight Used For Protein Calculations: 42.4 kg (93 lb 7.6 oz)  Protein Requirements: 64 gm protein/d; 1.5 gm/kg  Fluid Requirements (mL): 1484 ml/d; 1ml/clari        Enteral Nutrition     Patient not receiving enteral nutrition at this time.    Parenteral Nutrition     Patient not receiving parenteral nutrition support at this time.    Evaluation of Received Nutrient Intake    Calories: meeting estimated needs  Protein: meeting estimated needs    Patient Education     Not  applicable.    Nutrition Diagnosis     PES: Inadequate oral intake related to acute illness as evidenced by eatng 50% or less x 3 weeks . (resolved)     PES: Moderate acute disease or injury related malnutrition Related to spesis As Evidenced by:  - energy intake: <= 50% for 3 weeks (meets criteria for <= 50% >= 5 days (severe - acute)) - muscle mass depletion: 2 areas of mild muscle loss (Pectoralis, Clavicle) active    Nutrition Interventions     Intervention(s): general/healthful diet and commercial food  Intervention(s): Oral nutritional supplement;Oral diet/nutrient modifications;Care coordination or referral    Goal: Meet greater than 80% of nutritional needs by follow-up. (goal progressing)  Goal: Maintain weight throughout hospitalization. (goal progressing)    Nutrition Goals & Monitoring     Dietitian will monitor: food and beverage intake and weight  Discharge planning: continue regular  diet with boost  oral supplements  Nutrition Risk/Follow-Up: patient at increased nutrition risk; dietitian will follow-up twice weekly   Please consult if re-assessment needed sooner.

## 2025-06-27 VITALS
OXYGEN SATURATION: 95 % | DIASTOLIC BLOOD PRESSURE: 74 MMHG | TEMPERATURE: 98 F | HEIGHT: 60 IN | HEART RATE: 90 BPM | SYSTOLIC BLOOD PRESSURE: 134 MMHG | BODY MASS INDEX: 18.36 KG/M2 | RESPIRATION RATE: 18 BRPM | WEIGHT: 93.5 LBS

## 2025-06-27 LAB
ALBUMIN SERPL-MCNC: 2.4 G/DL (ref 3.4–4.8)
ALBUMIN/GLOB SERPL: 0.8 RATIO (ref 1.1–2)
ALP SERPL-CCNC: 71 UNIT/L (ref 40–150)
ALT SERPL-CCNC: 16 UNIT/L (ref 0–55)
ANION GAP SERPL CALC-SCNC: 9 MEQ/L
AST SERPL-CCNC: 12 UNIT/L (ref 11–45)
BASOPHILS # BLD AUTO: 0.03 X10(3)/MCL
BASOPHILS NFR BLD AUTO: 0.1 %
BILIRUB SERPL-MCNC: 0.2 MG/DL
BUN SERPL-MCNC: 17 MG/DL (ref 9.8–20.1)
CALCIUM SERPL-MCNC: 7.9 MG/DL (ref 8.4–10.2)
CHLORIDE SERPL-SCNC: 103 MMOL/L (ref 98–107)
CO2 SERPL-SCNC: 23 MMOL/L (ref 23–31)
CREAT SERPL-MCNC: 0.59 MG/DL (ref 0.55–1.02)
CREAT/UREA NIT SERPL: 29
EOSINOPHIL # BLD AUTO: 0 X10(3)/MCL (ref 0–0.9)
EOSINOPHIL NFR BLD AUTO: 0 %
ERYTHROCYTE [DISTWIDTH] IN BLOOD BY AUTOMATED COUNT: 15.5 % (ref 11.5–17)
GFR SERPLBLD CREATININE-BSD FMLA CKD-EPI: >60 ML/MIN/1.73/M2
GLOBULIN SER-MCNC: 3 GM/DL (ref 2.4–3.5)
GLUCOSE SERPL-MCNC: 181 MG/DL (ref 82–115)
HCT VFR BLD AUTO: 39.6 % (ref 37–47)
HGB BLD-MCNC: 12.7 G/DL (ref 12–16)
IMM GRANULOCYTES # BLD AUTO: 0.19 X10(3)/MCL (ref 0–0.04)
IMM GRANULOCYTES NFR BLD AUTO: 0.9 %
LYMPHOCYTES # BLD AUTO: 0.9 X10(3)/MCL (ref 0.6–4.6)
LYMPHOCYTES NFR BLD AUTO: 4.4 %
MAGNESIUM SERPL-MCNC: 1.6 MG/DL (ref 1.6–2.6)
MCH RBC QN AUTO: 29.5 PG (ref 27–31)
MCHC RBC AUTO-ENTMCNC: 32.1 G/DL (ref 33–36)
MCV RBC AUTO: 91.9 FL (ref 80–94)
MONOCYTES # BLD AUTO: 0.5 X10(3)/MCL (ref 0.1–1.3)
MONOCYTES NFR BLD AUTO: 2.4 %
NEUTROPHILS # BLD AUTO: 18.79 X10(3)/MCL (ref 2.1–9.2)
NEUTROPHILS NFR BLD AUTO: 92.2 %
NRBC BLD AUTO-RTO: 0 %
PHOSPHATE SERPL-MCNC: 2.8 MG/DL (ref 2.3–4.7)
PLATELET # BLD AUTO: 398 X10(3)/MCL (ref 130–400)
PMV BLD AUTO: 10.4 FL (ref 7.4–10.4)
POTASSIUM SERPL-SCNC: 3.8 MMOL/L (ref 3.5–5.1)
PROT SERPL-MCNC: 5.4 GM/DL (ref 5.8–7.6)
RBC # BLD AUTO: 4.31 X10(6)/MCL (ref 4.2–5.4)
SODIUM SERPL-SCNC: 135 MMOL/L (ref 136–145)
WBC # BLD AUTO: 20.41 X10(3)/MCL (ref 4.5–11.5)

## 2025-06-27 PROCEDURE — 25000003 PHARM REV CODE 250

## 2025-06-27 PROCEDURE — 27000221 HC OXYGEN, UP TO 24 HOURS

## 2025-06-27 PROCEDURE — 94761 N-INVAS EAR/PLS OXIMETRY MLT: CPT

## 2025-06-27 PROCEDURE — 85025 COMPLETE CBC W/AUTO DIFF WBC: CPT

## 2025-06-27 PROCEDURE — 25000242 PHARM REV CODE 250 ALT 637 W/ HCPCS

## 2025-06-27 PROCEDURE — 36415 COLL VENOUS BLD VENIPUNCTURE: CPT

## 2025-06-27 PROCEDURE — 83735 ASSAY OF MAGNESIUM: CPT

## 2025-06-27 PROCEDURE — 63600175 PHARM REV CODE 636 W HCPCS

## 2025-06-27 PROCEDURE — 94640 AIRWAY INHALATION TREATMENT: CPT

## 2025-06-27 PROCEDURE — 80053 COMPREHEN METABOLIC PANEL: CPT

## 2025-06-27 PROCEDURE — 84100 ASSAY OF PHOSPHORUS: CPT

## 2025-06-27 RX ORDER — PREDNISONE 20 MG/1
40 TABLET ORAL DAILY
Status: DISCONTINUED | OUTPATIENT
Start: 2025-06-27 | End: 2025-06-27 | Stop reason: HOSPADM

## 2025-06-27 RX ADMIN — DOXYCYCLINE HYCLATE 100 MG: 100 TABLET, COATED ORAL at 09:06

## 2025-06-27 RX ADMIN — PREDNISONE 40 MG: 20 TABLET ORAL at 09:06

## 2025-06-27 RX ADMIN — IPRATROPIUM BROMIDE AND ALBUTEROL SULFATE 3 ML: .5; 3 SOLUTION RESPIRATORY (INHALATION) at 12:06

## 2025-06-27 RX ADMIN — SACUBITRIL AND VALSARTAN 1 TABLET: 24; 26 TABLET, FILM COATED ORAL at 09:06

## 2025-06-27 RX ADMIN — GUAIFENESIN 600 MG: 600 TABLET, EXTENDED RELEASE ORAL at 09:06

## 2025-06-27 RX ADMIN — CEFTRIAXONE SODIUM 1 G: 1 INJECTION, POWDER, FOR SOLUTION INTRAMUSCULAR; INTRAVENOUS at 09:06

## 2025-06-27 RX ADMIN — TIOTROPIUM BROMIDE INHALATION SPRAY 2 PUFF: 3.12 SPRAY, METERED RESPIRATORY (INHALATION) at 08:06

## 2025-06-27 RX ADMIN — IPRATROPIUM BROMIDE AND ALBUTEROL SULFATE 3 ML: .5; 3 SOLUTION RESPIRATORY (INHALATION) at 05:06

## 2025-06-27 NOTE — PT/OT/SLP PROGRESS
Physical Therapy    Missed Treatment Session - cancel note - 06/27/2025    Patient Name:  Kristen Crowley   MRN:  [id number]      Patient not seen at this time secondary to Other (Comment) (Pt reports she has been getting up fine in her room, she took herself off the purewick and has been getting up to the bathroom and is going to take a nap now. Does not feel like she needs PT.).    Will follow-up as patient is appropriate/available/agreeable to participate and as therapists' schedule allows.

## 2025-06-27 NOTE — PT/OT/SLP DISCHARGE
POST DISCHARGE DOCUMENTATION - 2025 5:04 PM    Physical Therapy Discharge Note    Documenting Physical Therapist did not evaluate OR treat the patient.    Evaluating/treating Physical Therapist is not available to complete discharge summary.    Refer to prior Physical Therapy note dated 2025 for last known functional status of patient.      Name: Kristen Crowley  MRN: [id number]   Principal Problem: Pneumonia of right lower lobe due to infectious organism   1. COPD exacerbation    2. SOB (shortness of breath)    3. COPD with pneumonia    4. Aneurysm of descending thoracic aorta without rupture    5. Routine check-up    6. Pneumonia of right lower lobe due to infectious organism    7. Continuous leakage of urine    8. Thrombocytosis       Problem List[1]  Recommendations: per last treatment session     Therapy Intensity Recommendations at Discharge: Low Intensity Therapy  Discharge Equipment Recommendations: walker, rolling, oxygen     Assessment:     Patient last seen by PT SERVICES on 2025    Patient was unexpectedly discharged from hospital.    Refer to therapy's initial evaluation or last treatment (progress) note for patient's last known functional status, goal achievement and therapists' recommendations.    Objective:     GOALS: STG details - see below    Multidisciplinary Problems       Physical Therapy Goals       Problem: Physical Therapy    Goal Priority Disciplines Outcome Interventions   Physical Therapy Goal     PT, PT/OT Progressing    Description: Goals to be met by: Discharge   Patient will increase functional independence with mobility by performin. Sit to stand transfer with Modified [place] with RW  2. Gait  x 130 feet with Modified [place] using Rolling Walker with SpO2 >90%           Plan:     Patient Discharged to: home or self care per chart.    2025       [1]   Patient Active Problem List  Diagnosis    COPD with pneumonia    Hypothyroidism    Tobacco user     Family history of malignant neoplasm of colon    Arthritis of both hands    Pneumonia of right lower lobe due to infectious organism    SOB (shortness of breath)    Aneurysm of descending thoracic aorta without rupture    Moderate malnutrition

## 2025-06-27 NOTE — DISCHARGE INSTRUCTIONS
Discharge meds at bedside. Follow all medication orders as directed.  Keep follow up appointment already scheduled and to be scheduled.

## 2025-06-30 ENCOUNTER — PATIENT OUTREACH (OUTPATIENT)
Dept: ADMINISTRATIVE | Facility: CLINIC | Age: 70
End: 2025-06-30
Payer: MEDICARE

## 2025-07-01 NOTE — PROGRESS NOTES
2nd attempt C3 nurse attempted to contact Kristen Crowley for a TCC post hospital discharge follow up call. No answer. LVM requesting a callback at [phone]. The patient does not have a scheduled HOSFU appointment. Patient does not have a PCP.   
3rd attempt C3 nurse attempted to contact Kristen Crowley for a TCC post hospital discharge follow up call. No answer. LVM requesting a callback at [phone]. The patient does not have a scheduled HOSFU appointment. Patient does not have a PCP.   
C3 nurse attempted to contact Kristen Crowley for a TCC post hospital discharge follow up call. No answer. LVM requesting a callback at [phone]. The patient does not have a scheduled HOSFU appointment. Patient does not have a PCP.   
unknown

## 2025-07-02 ENCOUNTER — TELEPHONE (OUTPATIENT)
Dept: HEMATOLOGY/ONCOLOGY | Facility: CLINIC | Age: 70
End: 2025-07-02
Payer: MEDICARE

## 2025-07-02 NOTE — TELEPHONE ENCOUNTER
Hello,    [facility] referral received for this pt for thrombocytosis.    I have tried calling pt. 6/30/25 Unable to reach pt. 7/1/25 & 7/2/25 left voice mails.    I will happy to schedule an appt for her once she returns my call.    [phone]    COOPER Li

## 2025-07-30 ENCOUNTER — TELEPHONE (OUTPATIENT)
Dept: FAMILY MEDICINE | Facility: CLINIC | Age: 70
End: 2025-07-30
Payer: MEDICARE

## 2025-07-30 DIAGNOSIS — R32 URINARY INCONTINENCE, UNSPECIFIED TYPE: Primary | ICD-10-CM

## 2025-07-30 NOTE — TELEPHONE ENCOUNTER
Patient was referred to Urology clinic following hospital admission but they do not accept her insurance. Placing referral to clinic that accepts patient insurance.